# Patient Record
Sex: FEMALE | Race: WHITE | Employment: OTHER | URBAN - NONMETROPOLITAN AREA
[De-identification: names, ages, dates, MRNs, and addresses within clinical notes are randomized per-mention and may not be internally consistent; named-entity substitution may affect disease eponyms.]

---

## 2017-01-12 PROBLEM — G57.43: Status: ACTIVE | Noted: 2017-01-12

## 2017-01-12 PROBLEM — G57.33 PERONEAL NERVE LESION OF BOTH LOWER EXTREMITIES: Status: ACTIVE | Noted: 2017-01-12

## 2017-01-23 PROBLEM — R42 DIZZINESS: Status: ACTIVE | Noted: 2017-01-23

## 2019-02-04 PROBLEM — Z91.81 AT HIGH RISK FOR FALLS: Status: ACTIVE | Noted: 2019-02-04

## 2019-02-04 PROBLEM — Z91.199 NON-COMPLIANCE: Status: ACTIVE | Noted: 2019-02-04

## 2019-06-10 PROBLEM — L72.0 EPIDERMOID CYST: Status: ACTIVE | Noted: 2019-06-10

## 2020-09-11 PROBLEM — Q05.4 SPINA BIFIDA WITH HYDROCEPHALUS (HCC): Status: ACTIVE | Noted: 2020-09-11

## 2020-09-11 PROBLEM — G40.909 SEIZURE CEREBRAL (HCC): Status: ACTIVE | Noted: 2020-09-11

## 2020-09-11 PROBLEM — M54.40 CHRONIC LOW BACK PAIN WITH SCIATICA: Status: ACTIVE | Noted: 2020-09-11

## 2022-01-11 PROBLEM — Z86.69 HISTORY OF TETHERED SPINAL CORD: Status: ACTIVE | Noted: 2022-01-11

## 2022-01-11 PROBLEM — G91.1 OBSTRUCTIVE HYDROCEPHALUS (HCC): Status: ACTIVE | Noted: 2022-01-11

## 2022-02-15 PROBLEM — T85.09XA OBSTRUCTED VP SHUNT, INITIAL ENCOUNTER (HCC): Status: ACTIVE | Noted: 2022-02-15

## 2022-02-15 PROBLEM — G43.009 MIGRAINE WITHOUT AURA AND WITHOUT STATUS MIGRAINOSUS, NOT INTRACTABLE: Status: ACTIVE | Noted: 2022-02-15

## 2022-12-15 PROBLEM — G63 POLYNEUROPATHY ASSOCIATED WITH UNDERLYING DISEASE (HCC): Status: ACTIVE | Noted: 2022-12-15

## 2023-01-26 DIAGNOSIS — G43.009 MIGRAINE WITHOUT AURA AND WITHOUT STATUS MIGRAINOSUS, NOT INTRACTABLE: ICD-10-CM

## 2023-01-26 DIAGNOSIS — G44.86 CERVICOGENIC HEADACHE: ICD-10-CM

## 2023-01-26 RX ORDER — BUTALBITAL, ACETAMINOPHEN AND CAFFEINE 50; 325; 40 MG/1; MG/1; MG/1
TABLET ORAL
Qty: 60 TABLET | Refills: 1 | Status: SHIPPED | OUTPATIENT
Start: 2023-01-26 | End: 2023-02-23 | Stop reason: SDUPTHER

## 2023-03-16 ENCOUNTER — OFFICE VISIT (OUTPATIENT)
Dept: NEUROLOGY | Age: 38
End: 2023-03-16
Payer: MEDICARE

## 2023-03-16 VITALS
DIASTOLIC BLOOD PRESSURE: 64 MMHG | HEIGHT: 59 IN | BODY MASS INDEX: 26.61 KG/M2 | OXYGEN SATURATION: 98 % | SYSTOLIC BLOOD PRESSURE: 96 MMHG | HEART RATE: 90 BPM | WEIGHT: 132 LBS

## 2023-03-16 DIAGNOSIS — G44.86 CERVICOGENIC HEADACHE: ICD-10-CM

## 2023-03-16 DIAGNOSIS — R41.3 MEMORY PROBLEM: ICD-10-CM

## 2023-03-16 DIAGNOSIS — R42 DIZZINESS: ICD-10-CM

## 2023-03-16 DIAGNOSIS — G93.5 CHIARI MALFORMATION TYPE I (HCC): ICD-10-CM

## 2023-03-16 DIAGNOSIS — G89.29 CHRONIC LOW BACK PAIN WITH SCIATICA, SCIATICA LATERALITY UNSPECIFIED, UNSPECIFIED BACK PAIN LATERALITY: ICD-10-CM

## 2023-03-16 DIAGNOSIS — R26.9 GAIT DIFFICULTY: ICD-10-CM

## 2023-03-16 DIAGNOSIS — Q07.00 ARNOLD-CHIARI DEFORMITY (HCC): ICD-10-CM

## 2023-03-16 DIAGNOSIS — Q05.4 SPINA BIFIDA WITH HYDROCEPHALUS, UNSPECIFIED SPINAL REGION (HCC): ICD-10-CM

## 2023-03-16 DIAGNOSIS — Z91.81 AT HIGH RISK FOR FALLS: ICD-10-CM

## 2023-03-16 DIAGNOSIS — G63 POLYNEUROPATHY ASSOCIATED WITH UNDERLYING DISEASE (HCC): ICD-10-CM

## 2023-03-16 DIAGNOSIS — M54.40 CHRONIC LOW BACK PAIN WITH SCIATICA, SCIATICA LATERALITY UNSPECIFIED, UNSPECIFIED BACK PAIN LATERALITY: ICD-10-CM

## 2023-03-16 DIAGNOSIS — M54.2 NECK PAIN: ICD-10-CM

## 2023-03-16 DIAGNOSIS — G43.009 MIGRAINE WITHOUT AURA AND WITHOUT STATUS MIGRAINOSUS, NOT INTRACTABLE: Primary | ICD-10-CM

## 2023-03-16 DIAGNOSIS — G40.909 SEIZURE CEREBRAL (HCC): ICD-10-CM

## 2023-03-16 DIAGNOSIS — G60.9 IDIOPATHIC PERIPHERAL NEUROPATHY: ICD-10-CM

## 2023-03-16 DIAGNOSIS — N31.9 NEUROGENIC BLADDER: ICD-10-CM

## 2023-03-16 PROCEDURE — G8419 CALC BMI OUT NRM PARAM NOF/U: HCPCS | Performed by: PSYCHIATRY & NEUROLOGY

## 2023-03-16 PROCEDURE — 99214 OFFICE O/P EST MOD 30 MIN: CPT | Performed by: PSYCHIATRY & NEUROLOGY

## 2023-03-16 PROCEDURE — G8427 DOCREV CUR MEDS BY ELIG CLIN: HCPCS | Performed by: PSYCHIATRY & NEUROLOGY

## 2023-03-16 PROCEDURE — G8482 FLU IMMUNIZE ORDER/ADMIN: HCPCS | Performed by: PSYCHIATRY & NEUROLOGY

## 2023-03-16 PROCEDURE — 4004F PT TOBACCO SCREEN RCVD TLK: CPT | Performed by: PSYCHIATRY & NEUROLOGY

## 2023-03-16 RX ORDER — GABAPENTIN 300 MG/1
300 CAPSULE ORAL DAILY
COMMUNITY
Start: 2023-01-09

## 2023-03-16 RX ORDER — FLUOXETINE HYDROCHLORIDE 20 MG/1
CAPSULE ORAL
COMMUNITY
Start: 2023-02-06

## 2023-03-16 RX ORDER — BUTALBITAL, ACETAMINOPHEN AND CAFFEINE 50; 325; 40 MG/1; MG/1; MG/1
TABLET ORAL
Qty: 60 TABLET | Refills: 2 | Status: SHIPPED | OUTPATIENT
Start: 2023-03-16

## 2023-03-16 RX ORDER — PROMETHAZINE HYDROCHLORIDE 25 MG/1
TABLET ORAL
Qty: 60 TABLET | Refills: 2 | Status: SHIPPED | OUTPATIENT
Start: 2023-03-16

## 2023-03-16 RX ORDER — BUSPIRONE HYDROCHLORIDE 5 MG/1
5 TABLET ORAL 3 TIMES DAILY
COMMUNITY
Start: 2022-09-08

## 2023-03-16 RX ORDER — QUETIAPINE 150 MG/1
TABLET, FILM COATED, EXTENDED RELEASE ORAL
COMMUNITY
Start: 2023-01-19 | End: 2023-03-16

## 2023-03-16 RX ORDER — MULTIVITAMIN
TABLET ORAL
COMMUNITY
Start: 2022-12-09

## 2023-03-16 RX ORDER — MECLIZINE HYDROCHLORIDE 25 MG/1
TABLET ORAL
Qty: 90 TABLET | Refills: 2 | Status: SHIPPED | OUTPATIENT
Start: 2023-03-16

## 2023-03-16 RX ORDER — LISINOPRIL 10 MG/1
TABLET ORAL
COMMUNITY
Start: 2023-01-06

## 2023-03-16 RX ORDER — OXCARBAZEPINE 150 MG/1
TABLET, FILM COATED ORAL DAILY
COMMUNITY
Start: 2023-03-03

## 2023-03-16 RX ORDER — OXYBUTYNIN CHLORIDE 15 MG/1
TABLET, EXTENDED RELEASE ORAL
COMMUNITY
Start: 2023-01-23

## 2023-03-16 ASSESSMENT — ENCOUNTER SYMPTOMS
COUGH: 0
BOWEL INCONTINENCE: 1
SWOLLEN GLANDS: 0
ABDOMINAL DISTENTION: 0
RHINORRHEA: 0
DIARRHEA: 0
CONSTIPATION: 0
FACIAL SWEATING: 0
COLOR CHANGE: 0
BLOOD IN STOOL: 0
EYE PAIN: 0
WHEEZING: 0
BLURRED VISION: 0
SORE THROAT: 0
EYE ITCHING: 0
VOICE CHANGE: 0
ABDOMINAL PAIN: 0
CHOKING: 0
CHEST TIGHTNESS: 0
CHANGE IN BOWEL HABIT: 0
SCALP TENDERNESS: 0
NAUSEA: 1
FACIAL SWELLING: 0
APNEA: 0
VOMITING: 0
SINUS PRESSURE: 0
EYE DISCHARGE: 0
SHORTNESS OF BREATH: 0
EYE WATERING: 0
EYE REDNESS: 0

## 2023-03-16 NOTE — PROGRESS NOTES
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          9, 10 CN: Normal spontaneous, reflex palate movements         11 CN:   Normal  Shoulder shrug and  Strength         12 CN :   Normal  Tongue movements and  Tongue  In midline                        No tongue   Fasciculations or atrophy         C) MOTOR  EXAM:                   Strength  In upper  Extremities   within normal limits. BOTH  LOWER  EXTREMITIES   ARE  DEFORMED  IN  BRACES                         WITH  SPASTICITY                                      No  Asterixis.  Sustention  Tremor , Resting  Tremor   absent                    No other  Abnormal  Movements noted             D) SENSORY :               light touch, pinprick, position   DECREASED   IN  BOTH  LOWER  LEGS        E) REFLEXES:                   Deep  Tendon  Reflexes   BRISK                                                   F) COORDINATION  AND  GAIT :                                Station and  Gait  SLOW , WIDE BASED, UNSTEADY                                      Romberg's test   POSITIVE                          Ataxia   POSITIVE           Assessment:           Patient Active Problem List   Diagnosis    Migraine headache    Chronic back pain    Anxiety    Chronic diarrhea    Neck pain    Muscle spasm    Depressed    Cervicogenic headache    Asthma    Lumbar disc disease    Chiari malformation type I ([facility])    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[facility])    Gait difficulty    Cervical radiculopathy     (ventriculoperitoneal) shunt status    Neurogenic bladder    Chiari malformation type II ([facility])    Chronic pain syndrome    Spina bifida ([facility])    Chronic lumbar radiculopathy    Balance problem    Smoker    Peroneal nerve lesion of both lower extremities    Tibial neuropathy of both lower extremities    Peripheral neuropathy    Dizziness    At high risk for falls    H/O whiplash injury to neck    Non-compliance    Epidermal cyst    Memory problem    Spina bifida with hydrocephalus ([facility])    Seizure cerebral ([facility])    Chronic low back pain with sciatica    Gastroesophageal reflux disease without esophagitis    Obstructive hydrocephalus ([facility])    History of tethered spinal cord    Obstructed ventriculoperitoneal shunt ([facility])    Migraine without aura and without status migrainosus, not intractable    Post-op pain    Uterine prolapse    Status post vaginal hysterectomy    Diminished sensation due to laxity of vagina    TVH, BS, Cysto, Mccal Culdoplasty, Perineoplasty 4/28/22    Polyneuropathy associated with underlying disease ([facility])  CT OF THE HEAD WITHOUT CONTRAST  2/15/2022 11:07 am       TECHNIQUE:   CT of the head was performed without the administration of intravenous   contrast. Dose modulation, iterative reconstruction, and/or weight based   adjustment of the mA/kV was utilized to reduce the radiation dose to as low   as reasonably achievable. COMPARISON:   CT brain performed 03/05/2021. MRI brain performed 12/04/2021. HISTORY:   ORDERING SYSTEM PROVIDED HISTORY: chiari Type 2   TECHNOLOGIST PROVIDED HISTORY:       chiari Type 2   Is the patient pregnant?->No       FINDINGS:   BRAIN/VENTRICLES: There is no acute intracranial hemorrhage, mass effect, or   midline shift. There is satisfactory overall gray-white matter   differentiation. There is redemonstration of cerebellar tonsillar ectopia   that is similar compared to prior. The ventricular structures are   symmetrically prominent and stable in size compared to prior. There is   redemonstration of a shunt tube from a left parietal approach. The   infratentorial structures are unremarkable. ORBITS: The visualized portion of the orbits demonstrate no acute abnormality. SINUSES: The visualized paranasal sinuses and mastoid air cells demonstrate   no acute abnormality. SOFT TISSUES/SKULL:  No acute abnormality of the visualized skull or soft   tissues. Impression   No acute intracranial abnormality. Cerebellar tonsillar ectopia and symmetric ventricular enlargement that is   stable compared to prior. CT OF THE HEAD WITHOUT CONTRAST  3/5/2021 1:20 pm       TECHNIQUE:   CT of the head was performed without the administration of intravenous   contrast. Dose modulation, iterative reconstruction, and/or weight based   adjustment of the mA/kV was utilized to reduce the radiation dose to as low   as reasonably achievable. COMPARISON:   MRI of the brain performed 02/25/2019.        HISTORY:   ORDERING SYSTEM PROVIDED HISTORY: Dizziness   TECHNOLOGIST PROVIDED HISTORY:   Is the patient pregnant?->No   Reason for Exam: Shunt causing pain to left side of neck x 4 days   Acuity: Acute   Type of Exam: Initial       FINDINGS:   BRAIN/VENTRICLES: There is no acute intracranial hemorrhage, mass effect, or   midline shift. There is satisfactory gray-white matter differentiation. There is a prominent ventricular system with a shunt tube from a left   parietal approach. The shunt tube is discontinuous better visualized on the   CT soft tissue neck. There is significant cerebellar tonsillar ectopia   redemonstrated. ORBITS: The visualized portion of the orbits demonstrate no acute abnormality. SINUSES: The visualized paranasal sinuses and mastoid air cells demonstrate   no acute abnormality. SOFT TISSUES/SKULL:  No acute abnormality of the visualized skull or soft   tissues. Impression   No acute intracranial abnormality. Prominent ventricular system with a shunt tube from a left parietal approach   that is discontinuous and better visualized on the CT soft tissue neck. Significant cerebellar tonsillar ectopia that is similar compared to prior   exam.               CT OF THE NECK WITHOUT CONTRAST  3/5/2021       TECHNIQUE:   CT of the neck was performed without the administration of intravenous   contrast. Multiplanar reformatted images are provided for review. Dose   modulation, iterative reconstruction, and/or weight based adjustment of the   mA/kV was utilized to reduce the radiation dose to as low as reasonably   achievable. COMPARISON:   Cervical spine radiographs performed 04/23/2008.        HISTORY:   ORDERING SYSTEM PROVIDED HISTORY: Dizziness   TECHNOLOGIST PROVIDED HISTORY:   Is the patient pregnant?->No   Reason for Exam: Shunt causing pain to left side of neck x 4 days   Acuity: Acute   Type of Exam: Initial       FINDINGS:   PHARYNX/LARYNX:  The palatine tonsils are normal in appearance. The tongue   is normal in appearance. The valleculae, epiglottis, aryepiglottic folds and   pyriform sinuses appear unremarkable. The true and false vocal cords are   normal in appearance. No mass or abscess is seen. SALIVARY GLANDS/THYROID:  The parotid and submandibular glands appear   unremarkable. There is a subtle right thyroid lobe nodule measuring 1.5 x   1.0 cm. LYMPH NODES:  No cervical or supraclavicular lymphadenopathy is seen. SOFT TISSUES:  There is left-sided shunt tubing that appears detached and   distracted approximately 6.0 cm. BRAIN/ORBITS/SINUSES:  The visualized portion of the intracranial contents   appear unremarkable. The visualized portion of the orbits, paranasal sinuses   and mastoid air cells demonstrate no acute abnormality. LUNG APICES/SUPERIOR MEDIASTINUM:  No focal consolidation is seen within the   visualized lung apices. No superior mediastinal lymphadenopathy or mass. The visualized portion of the trachea appears unremarkable. BONES:  No aggressive appearing lytic or blastic bony lesion. Impression   Redemonstration of a discontinuous left-sided shunt tube that is similar   compared to multiple prior examinations. No acute abnormality of the soft tissues of the neck. MRI OF THE BRAIN WITHOUT CONTRAST  2/25/2019 1:58 pm       TECHNIQUE:   Multiplanar multisequence MRI of the brain was performed without the   administration of intravenous contrast.       COMPARISON:   01/26/2017. HISTORY:   ORDERING SYSTEM PROVIDED HISTORY: Cervicogenic headache   TECHNOLOGIST PROVIDED HISTORY:   Ordering Physician Provided Reason for Exam:  Headaches and a history of   spina bifida. Acuity: Unknown   Type of Exam: Subsequent evaluation.    Additional signs and symptoms: Cervicogenic headache; Dizziness; Migraine   without aura and without status migrainosus, not intractable; Arnold chiari   malformation, type II; Gait difficulty. FINDINGS:   INTRACRANIAL STRUCTURES/VENTRICLES: There is no evidence of an acute infarct. A left parietal approach ventriculostomy catheter is unchanged in position. Unchanged low-lying cerebellar tonsils with a peg shaped configuration with   decreased size of the posterior fossa including effacement of the 4th   ventricle. Prominence of the lateral and 3rd ventricles, which is overall   unchanged in size from the prior exam.  No evidence of mass effect or midline   shift. T2 hyperintensity is seen within the bilateral periventricular   subcortical white matter predominantly posteriorly. The normal signal voids   within the major intracranial vessels appear maintained. No acute   abnormality in the sellar or suprasellar region. ORBITS: The visualized portion of the orbits demonstrate no acute abnormality. SINUSES: Mucosal thickening of the paranasal sinuses with an air-fluid level   within the left maxillary sinus. No acute abnormality of the mastoid air   cells. BONES/SOFT TISSUES: The bone marrow signal intensity appears normal. The soft   tissues demonstrate no acute abnormality. Impression   1. Overall, stable appearance of the brain. 2. Findings compatible with a Chiari malformation with a left parietal   approach ventriculostomy catheter. 3. There is unchanged size and configuration of the ventricular system. 4. No acute infarct. 5. Scattered mucosal thickening of the paranasal sinuses with an air-fluid   level in the left maxillary sinus.          MRI OF THE CERVICAL SPINE WITHOUT CONTRAST 2/25/2019 2:21 pm       TECHNIQUE:   Multiplanar multisequence MRI of the cervical spine was performed without the   administration of intravenous contrast.       COMPARISON:   6/20/2016       HISTORY:   ORDERING SYSTEM PROVIDED HISTORY: Cervicogenic headache   TECHNOLOGIST PROVIDED HISTORY:   Ordering Physician Provided Reason for Exam:  Neck pain and headaches. History of spina bifida. Acuity: Unknown   Type of Exam: Unknown   Additional signs and symptoms: Cervicogenic headache; Neck pain; Cervical   radiculopathy       Initial evaluation. FINDINGS:   BONES/ALIGNMENT: There is normal alignment of the spine. The vertebral body   heights are maintained. The bone marrow signal appears unremarkable. There   redemonstration of herniation of the cerebellar tonsils into the foramen   magnum to the level of C3, consistent with Chiari malformation. There is mild disc space narrowing and osteophytes at C3-4, C4-5, C5-6, and   C6-7. SPINAL CORD:  No abnormal signal is identified within the spinal cord. SOFT TISSUES: No paraspinal mass identified. There is an 8 mm nodule in the   right lobe of the thyroid, for which no follow-up is suggested per ACR   criteria. C2-C3:  The thecal sac and neural foramina are intact. C3-C4: There is a small disc protrusion. The thecal sac is not stenotic. The neural foramina are intact. C4-C5: There is a disc protrusion or osteophyte measuring 2-3 mm. The thecal   sac is mildly stenotic measuring 9.2 mm. Disc and/or osteophytes cause   minimal narrowing of the neural foramina bilaterally. C5-C6: There is a disc protrusion or osteophyte measuring 2-3 mm toward the   right. The thecal sac is mildly stenotic measuring 9.3 mm. Disc and/or   osteophytes result in mild narrowing of the neural foramina bilaterally. C6-C7:  The thecal sac and neural foramina are intact. C7-T1:  The thecal sac and neural foramina are intact. Impression   Redemonstration of Chiari malformation. Disc and osteophytes result in minimal narrowing of the neural foramina at   C4-5 and C5-6. No stenosis of the thecal sac in the cervical region. ULTRASOUND EVALUATION OF THE CAROTID ARTERIES       2/25/2019       COMPARISON:   None.        HISTORY:   ORDERING SYSTEM PROVIDED HISTORY: Cervicogenic headache   TECHNOLOGIST PROVIDED HISTORY:   Ordering Physician Provided Reason for Exam: neck pain and dizziness   Acuity: Acute   Type of Exam: Initial   Relevant Medical/Surgical History: Smoker 1PPD       FINDINGS:       RIGHT:       The right common carotid artery demonstrates peak systolic velocities of 84   and 77 cm/sec in the proximal and distal segments respectively. The right internal carotid artery demonstrates the systolic velocities of 81,   71, and 73 cm/sec in the proximal, mid and distal segments respectively. The external carotid artery is patent. The vertebral artery demonstrates   normal antegrade flow. Mild intimal thickening and soft plaque resulting in stenosis of   approximately 24% in the proximal ICA. ICA/CCA ratio of 1.0. LEFT:       The left common carotid artery demonstrates peak systolic velocities of 697,   85 cm/sec in the proximal and distal segments respectively. The left internal carotid artery demonstrates the systolic velocities of 71,   73, and 79 cm/sec in the proximal, mid and distal segments respectively. The external carotid artery is patent. The vertebral artery demonstrates   normal antegrade flow. Mild intimal thickening and soft plaque with measured stenosis of up to 41%   in the proximal internal carotid artery. ICA/CCA ratio of 0.7. Impression   The right internal carotid artery demonstrates 0-50% stenosis . The left internal carotid artery demonstrates 0-50% stenosis . Bilateral vertebral arteries are patent with flow in the normal direction.                  MRI OF THE BRAIN WITHOUT CONTRAST  1/26/2017 5:15 pm       TECHNIQUE:   Multiplanar multisequence MRI of the brain was performed without the   administration of intravenous contrast.       COMPARISON:   07/20/2016       HISTORY:   ORDERING SYSTEM PROVIDED HISTORY: Migraine without aura and with status migrainosus, not intractable   TECHNOLOGIST PROVIDED HISTORY:   Ordering Physician Provided Reason for Exam: Dizzy and light headed. Left   side of face and left arm go numb. Symptoms for 2 weeks. Patient has a    shunt. Acuity: Unknown   Type of Exam: Initial   Additional signs and symptoms: Migraine without aura and with status   migrainosus, not intractable; Cervicogenic headache; Chiari malformation type   I; Armani Patter chiari deformity; Gait difficulty; Arnold-Chiari malformation, type   II; Balance problem; Dizziness. FINDINGS:   INTRACRANIAL STRUCTURES/VENTRICLES: There is no acute infarct. Chiari 2   malformation changes once again noted including with cerebellar tonsillar   extension into the upper cervical spine and crowding of the foramen magnum   and posterior fossa. Findings are similar to previous. Left parietal   ventriculostomy catheter again noted. Thinned appearance of the septum   pellucidum again identified. Lateral ventricular enlargement without   significant transependymal edema again identified and unchanged. No midline shift. No new abnormal extra-axial fluid collections. ORBITS: The visualized portion of the orbits demonstrate no acute abnormality. SINUSES: Mild bilateral paranasal sinus mucosal thickening. BONES/SOFT TISSUES: The bone marrow signal intensity appears normal. The   craniocervical junction is normal in appearance. Impression   1. No significant interval change. Findings of Chiari 2 malformation once   again noted. 2. Lateral ventricular enlargement with ventriculostomy once again noted   appearing similar to previous and without evidence of significant interval   transependymal edema signal.             MRI OF THE LUMBAR SPINE WITHOUT CONTRAST, 11/28/2016 5:29 pm       TECHNIQUE:   Multiplanar multisequence MRI of the lumbar spine was performed without the   administration of intravenous contrast.       COMPARISON:   None. HISTORY:   ORDERING SYSTEM PROVIDED HISTORY: Arnold-Chiari malformation, type II [facility]   TECHNOLOGIST PROVIDED HISTORY:   Ordering Physician Provided Reason for Exam:  Low back pain, bilateral leg   pain. Symptoms for a while, worse recently. Acuity: Chronic   Type of Exam: Initial   Additional signs and symptoms:  Chronic lumbar radiculopathy; Lumbar disc   disease; Chronic low back pain with sciatica, sciatica laterality   unspecified, unspecified back pain laterality; Muscle spasm. FINDINGS:   Visualized spinal cord demonstrates mild loss of caliber. Intramedullary T2   hyperintense signal noted at approximately L1 level may relate with a   terminal ventricle or small syrinx. Low-lying conus is noted with tethering   of the cord along with this rate thick appearance of the spinal cord at the   lumbosacral junction with neural placode reaching the subcutaneous fat at   approximately L5-S1 level. Vertebral body heights are essentially maintained. Disc desiccation at L4-5   level       . L1-L2: Bilateral facet arthrosis with ligamentum flavum thickening with mild   bilateral neural foraminal narrowing without significant central canal   stenosis. L2-L3: Bilateral facet arthrosis with ligamentum flavum thickening and   broad-based disc bulge with mild bilateral neural foraminal narrowing without   significant central canal stenosis. L3-L4: Bilateral facet arthrosis with ligamentum flavum thickening and   broad-based disc bulge with moderate to severe bilateral neural foraminal   narrowing. Mild approximation of the exiting right L3 nerve root. L4-L5: Broad-based disc bulge with a central disc extrusion with annulus   fissure resulting in severe bilateral neural foraminal narrowing without   significant central canal stenosis. L5-S1: There is no significant disc herniation, spinal canal stenosis or   neural foraminal narrowing.            Impression   Redemonstration of low-lying conus with tethered cord with associated spinal   dysraphism with postsurgical changes. Lumbar spondylotic changes predominant at L4-5 and L3-L4 levels. Please see   above level by level complete analysis. MRI BRAIN WO CONTRAST       HISTORY:     Neck pain, Muscle spasm, Other depression, Cervicogenic headache, Mild intermittent asthma without complication, Lumbar disc disease, Chiari malformation type I (HCC), 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HCC), Migraine without aura and with status    migrainosus, not intractable, Spina bifida ([address] Anxiety, Chr           TECHNIQUE: Multiplanar multisequence MR images of the brain obtained without contrast.         COMPARISON: 1/22/10. FINDINGS:    Chiari 2 malformation changes again identified with cerebellar tonsillar ectopia and tectorial beaking. Foramen magnum crowding again noted. Left parietal ventriculostomy catheter again identified, unchanged in appearance. Lateral ventricular enlargement once again identified. Colpocephalic pattern, similar to previous noted. The lateral ventricles appear slightly larger compared to    previous. No significant interval transependymal edema is noted. Thinned/absent septum pellucidum. Small appearance of the corpus callosum splenium. Diffusion-weighted images and ADC maps do not demonstrate areas of restricted diffusion. No acute intracranial hemorrhage, mass effect, midline shift or obstructive hydrocephalus. No abnormal extra-axial fluid collections. Age normal brain volume. Mild bilateral paranasal sinus mucosal thickening. Unremarkable sellar appearance. Craniocervical junction appears unremarkable without evidence of significant cerebellar tonsillar ectopia. Preserved skull base major arterial flow voids on T2-weighted images. Impression   1. Chiari 2 changes again noted.    2.  Enlarged lateral ventricles, similar to previous and appearing only slightly increased in size. No significant interval transependymal edema signal is noted. Final report electronically signed by Corky Rose M.D. on 7/20/2016          MRI CERVICAL SPINE WO CONTRAST       HISTORY:     Neck pain, Muscle spasm, Other depression, Cervicogenic headache, Mild intermittent asthma without complication, Lumbar disc disease, Chiari malformation type I ([address] 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HCC), Migraine without aura and with status    migrainosus, not intractable, Spina bifida ([address] Anxiety, Chr           TECHNIQUE: Multiplanar multisequence MR images of the cervical spine were obtained. COMPARISON: 1/22/10       FINDINGS:    Chiari 2 malformation changes again identified with cerebellar tonsillar ectopia and a crowded foramen magnum as well as posterior fossa. Straightening of cervical lordosis. Minimal dextroconvex cervicothoracic scoliosis. No cervical spinal cord syrinx is identified. 7.5 MM right thyroid cyst or nodule, high T2 signal.    C3-4: Slight disc bulge without significant central spinal stenosis. C4-5: Slight disc bulge/small central disc protrusion without significant cervical spinal stenosis. C5-6: Minimal right-sided uncovertebral joint hypertrophy without significant spinal stenosis. Impression   1. Slight disc bulges in the mid and upper cervical spine without contributing to significant cervical spinal stenosis. 2.  No cervical spinal cord syrinx. 3.  Chiari 2 malformation changes. Final report electronically signed by Corky Rose M.D. on 7/20/2016             May  8 2013  9:52PM [id number]  CT LUMBAR AND OR SACRUM WO    CONT   Reason for Exam:  ^Pain/trauma       FULL RESULT:   CT lumbar spine without contrast       Clinical indication: Fall 4 days ago. Comparison: None. Technique: Transaxial tomograms were obtained through the lumbar   spine without contrast, including the upper sacrum.   Multiplanar   reconstructions were performed. Findings: There is general maintenance of the lumbar vertebral   body heights. A 2 mm nonobstructive stone seen in the upper pole   left kidney. No prevertebral soft tissue abnormality is   identified. No definite fracture or dislocation is seen. The T11-T12 disc   space is unremarkable. The T12-L1 disc space is unremarkable. The L1-L2 disc space is unremarkable. At L2-L3, the disc space is unremarkable. There is mild   bilateral facet arthrosis. At L3-L4, no significant central spinal stenosis is seen. The   neural foramina are patent. Bilateral facet arthrosis is   present. At L4-L5, the disc space is mildly narrowed. There is a   broad-based central and left paracentral disc protrusion. This   contacts the left anterior thecal sac. There is ligamentum   flavum hypertrophy bilaterally along with facet arthrosis. This   is narrowing each of the respective neural foramina. At L5-S1, there is a spina bifida defect. There is facet   arthrosis versus postsurgical changes on the right. A   myelomeningocele is present here. There is some nonspecific   calcification of the posterior margin of the thecal sac on the   left. No other significant findings are noted. IMPRESSION:   Impression:   1. No acute findings. 2.  Spina bifida defect L5-S1 with a myelomeningocele. There   are some nonspecific calcifications at the posterior margin of   thecal sac. There appears to be a tethered cord. 3.  Broad-based central left paracentral disc protrusion at   L4-L5. 4.  Multilevel facet arthrosis as above. 5.  Punctate nonobstructive stone left kidney. 6.  Stable study. This report was electronically signed by: Tianna Martinez MD       8th May, 2013 10:29:00PM EDT   Transcribed by: [facility] on May  8 2013 10:29P         Plan:         VISITING DIAGNOSIS:        ICD-10-CM    1.  Migraine without aura and without status migrainosus, not intractable  G43.009 butalbital-acetaminophen-caffeine (FIORICET, ESGIC) -40 MG per tablet     promethazine (PHENERGAN) 25 MG tablet      2. Polyneuropathy associated with underlying disease ([facility] [address]  G63       3. Spina bifida with hydrocephalus, unspecified spinal region [facility]  Q05.4       4. Obstructive hydrocephalus (HCC)  G91.1       5. Seizure cerebral ([facility]  G40.909       6. Chronic low back pain with sciatica, sciatica laterality unspecified, unspecified back pain laterality  M54.40     G89.29       7. Neck pain  M54.2       8. Neurogenic bladder  N31.9       9. Gait difficulty  R26.9       10. Memory problem  R41.3       11. Dizziness  R42 meclizine (ANTIVERT) 25 MG tablet      12. Cervicogenic headache  G44.86 butalbital-acetaminophen-caffeine (FIORICET, ESGIC) -40 MG per tablet     promethazine (PHENERGAN) 25 MG tablet      13. Chiari malformation type I ([facility]  G93.5       14. 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HCC)  Q07.00       15. At high risk for falls  Z91.81       16. Idiopathic peripheral neuropathy  G60.9                  CONCERNS   &   INCREASED   RISK   FOR        *   CHRONIC  MIGRAINES  AND  HEADACHES        *   COGNITIVE  AND  MEMORY  PROBLEMS         *  MONONEUROPATHIES, RADICULOPATHY  AND  PLEXOPATHY        *  GAIT  DIFFICULTIES  &   BALANCE PROBLMES   AND  FALL                  VARIOUS  RISK   FACTORS   WERE  REVIEWED   AND   DISCUSSED. *  PATIENT   HAS  MULTIPLE   MEDICAL,  NEUROLOGICAL      AND   MENTAL  HEALTH  PROBLEMS . PATIENT'S   MANAGEMENT  IS  CHALLENGING. PLAN:       Ana M Chandler  Of  The  Diagnoses,  The  Management & Treatment  Options           AND    Care  plan  Were        Reviewed and   Discussed   With  patient. * Goals  And  Expectations  Of  The  Therapy  Discussed   And  Reviewed.        *   Benefits   And   Side  Effect  Profile  Of  Medication/s   Were   Discussed             * Need   For  Further   Follow up For  The  Various  Problems  Were discussed. * Results  Of  The  Previous  Diagnostic tests were reviewed and questions answered. patient  understand the same. Medical  Decision  Making  Was  Made  Based on the   Complexity  Of  Above  Mentioned  Diagnoses,        Data reviewed   & diagnostic  Tests  Reviewed,  Risk  Of  Significant   Co morbidities and complicating   Factors. Medical  Decision  Was   High  Complexity  Due   To  The  Patient's  Multiple  Symptoms,  Advancing   Disease,      Complex  Treatment  Regimen,  Multiple medications and   Risk  Of   Side  Effects,  Difficulty  In  Medication      Management  And  Diagnostic  Challenges   In  View  Of  The  Associated   Co  Morbid  Conditions   And  Problems. * FALL   PRECAUTIONS. THESE  REVIEWED   AND  DISCUSSED      *  USE   WALKING  ASSISTANCE  DEVICES     QUAD  CANE  /   WALKER          *   BE  CAREFUL  WITH  ACTIVITIES   INCLUDING  DRIVING. *   AVOID   NECK  AND/ BACK  STRAINING  ACTIVITIES        *   ADEQUATE   FLUID  INTAKE   AND  ELECTROLYTE  BALANCE         * KEEP  DAIRY  OF   THE  NEUROLOGICAL  SYMPTOMS        RECORDING THE    DURATION  AND  FREQUENCY. *  AVOID    CONDITIONS  AND  FACTORS   THAT  MAKE   NEUROLOGICAL  SYMPTOMS  WORSE. *  TO  MAINTAIN  REGULAR  SLEEP  WAKE  CYCLES. *   TO  HAVE  ADEQUATE  REST  AND   SLEEP    HOURS.          *    AVOID  ANY USAGE OF                   TOBACCO,  EXCESSIVE  ALCOHOL  AND   ILLEGAL   SUBSTANCES              *  CONTINUE MEDICATIONS PRESCRIBED    AS    RECOMMENDED       *   Compliance   With  Medications   And  Instructions        * CURRENTLY  TOLERATING  THE  PRESCRIBED   MEDICATIONS. WITHOUT  ANY  SIGNIFICANT  SIDE  EFFECTS   &  GETTING BENEFIT.           *  May   Use  Pill  Box,    If  Needed        *        Antiplatelet  therapy    As   Recommended  Was   Discussed        *    Prophylactic  Use   Of     Vitamin   B   Complex,  Folic Acid,    Vitamin  B12    Multivitamin   Tablet  Daily        Supplementations   Over  The  Counter  Discussed             *  FOOT  CARE, DAILY  INSPECTION  OF  FEET   AND         PERIODIC  PODIATRY EVALUATIONS . *  EVALUATIONS  AND  FOLLOW UP:                  * PHYSICAL  THERAPY                                                                                        *  NEUROSURGERY  /   [facility]              *  PAIN  MANAGEMENT                  *    UROLOGY                -  DISCUSSED  WITH PATIENT  DURING  MULTIPLE   VISITS     IN    THE  PAST      AND                         PATIENT  NOT  INTERESTED                       Controlled Substances Monitoring: Periodic Controlled Substance Monitoring: Possible medication side effects, risk of tolerance/dependence & alternative treatments discussed. , Assessed functional status. Jose Deshpande MD)          Orders Placed This Encounter   Medications    butalbital-acetaminophen-caffeine (FIORICET, ESGIC) -40 MG per tablet     Si-2    TABLETS   TWICE  DAILY   AS  NEEDED     Dispense:  60 tablet     Refill:  2    meclizine (ANTIVERT) 25 MG tablet     Sig: take 1 tablet by mouth three times a day if needed     Dispense:  90 tablet     Refill:  2    promethazine (PHENERGAN) 25 MG tablet     Sig: ONE   TABLET    2 - 3    TIMES   DAILY      AS   NEEDED     Dispense:  60 tablet     Refill:  2                                     *PATIENT   TO  FOLLOW  UP  WITH   PRIMARY  CARE   AND   OTHER  CONSULTANTS  AS  BEFORE.           *TO  FOLLOW  WITH   MENTAL  HEALTH  PROFESSIONALS ,  INCLUDING            PSYCHOLOGICAL  COUNSELING   AND  PSYCHIATRIC  EVALUTIONS                *  Maintain   Healthy  Life Style    With   Periodic  Monitoring  Of      Any  Medical  Conditions  Including   Elevated  Blood  Pressure,  Lipid  Profile,     Blood  Sugar levels  And   Heart  Disease.                 *   Period   Screening  For  Cancers  Involving  Breast, Colon,     lungs  And  Other  Organs  As  Applicable,  In consultation   With  Your  Primary Care Providers. * Second  Neurological  Opinion  And  Evaluations  In  [facility]  Setting  If  Patient  Is  Interested. *  If  The  Patient remains  Neurologically  Stable   Return   To  [facility] Neurology Department       IN   3 - 4     MONTHS  TIME   FOR  FURTHER  FOLLOW UP.                 *  If   There is  Any  Significant  Worsening   Of  Current  Symptoms  And  Or  If patient  Develops       Any additional  New  Neurological  Symptoms  Or  Significant  Concerns   Should  Call  911 or      Go  To  Emergency  Department  For  Further  Immediate  Evaluation. *   The  Neurological   Findings,  Possible  Diagnosis,  Differential diagnoses   And  Options      For    Further   Investigations   And  management   Are  Discussed  Comprehensively. Medications   And  Prescription   Risks  And  Side effects  Are   Also  Discussed. The  Above  Were  Reviewed  With  patient and     questions  Answered  In  Detail. More   Than   50% of face  To face Time   Was  Spent  On  Counseling   And   Coordination  Of  Care       Of   Patient's multiple   Neurological  Problems   And   Comorbid  Medical   Conditions. Electronically signed by Kiley Ryan MD.,  [facility]       Board Certified in  Neurology &  In  [facility] [address] of Psychiatry and Neurology (ABPN)      DISCLAIMER:   Although every effort was made to ensure the accuracy of this  electronic transcription, some errors in transcription may have occurred. GENERAL PATIENT INSTRUCTIONS:     A Healthy Lifestyle: Care Instructions  Your Care Instructions  A healthy lifestyle can help you feel good, stay at a healthy weight, and have plenty of energy for both work and play. A healthy lifestyle is something you can share with your whole family.   A healthy lifestyle also can lower your risk for serious health problems, such as high blood pressure, heart disease, and diabetes. You can follow a few steps listed below to improve your health and the health of your family. Follow-up care is a key part of your treatment and safety. Be sure to make and go to all appointments, and call your doctor if you are having problems. Its also a good idea to know your test results and keep a list of the medicines you take. How can you care for yourself at home? Do not eat too much sugar, fat, or fast foods. You can still have dessert and treats now and then. The goal is moderation. Start small to improve your eating habits. Pay attention to portion sizes, drink less juice and soda pop, and eat more fruits and vegetables. Eat a healthy amount of food. A 3-ounce serving of meat, for example, is about the size of a deck of cards. Fill the rest of your plate with vegetables and whole grains. Limit the amount of soda and sports drinks you have every day. Drink more water when you are thirsty. Eat at least 5 servings of fruits and vegetables every day. It may seem like a lot, but it is not hard to reach this goal. A serving or helping is 1 piece of fruit, 1 cup of vegetables, or 2 cups of leafy, raw vegetables. Have an apple or some carrot sticks as an afternoon snack instead of a candy bar. Try to have fruits and/or vegetables at every meal.  Make exercise part of your daily routine. You may want to start with simple activities, such as walking, bicycling, or slow swimming. Try to be active 30 to 60 minutes every day. You do not need to do all 30 to 60 minutes all at once. For example, you can exercise 3 times a day for 10 or 20 minutes. Moderate exercise is safe for most people, but it is always a good idea to talk to your doctor before starting an exercise program.  Keep moving. Alexys George the lawn, work in the garden, or [employer].  Take the stairs instead of the elevator at work. If you smoke, quit. People who smoke have an increased risk for heart attack, stroke, cancer, and other lung illnesses. Quitting is hard, but there are ways to boost your chance of quitting tobacco for good. Use nicotine gum, patches, or lozenges. Ask your doctor about stop-smoking programs and medicines. Keep trying. In addition to reducing your risk of diseases in the future, you will notice some benefits soon after you stop using tobacco. If you have shortness of breath or asthma symptoms, they will likely get better within a few weeks after you quit. Limit how much alcohol you drink. Moderate amounts of alcohol (up to 2 drinks a day for men, 1 drink a day for women) are okay. But drinking too much can lead to liver problems, high blood pressure, and other health problems. Family health  If you have a family, there are many things you can do together to improve your health. Eat meals together as a family as often as possible. Eat healthy foods. This includes fruits, vegetables, lean meats and dairy, and whole grains. Include your family in your fitness plan. Most people think of activities such as jogging or tennis as the way to fitness, but there are many ways you and your family can be more active. Anything that makes you breathe hard and gets your heart pumping is exercise. Here are some tips:  Walk to do errands or to take your child to school or the bus. Go for a family bike ride after dinner instead of watching TV. Where can you learn more? Go to [email or web address]://[email or web address]. org and sign in to your [email or web address] account. Enter G077 in the [facility] box to learn more about \"A Healthy Lifestyle: Care Instructions. \"     If you do not have an account, please click on the \"Sign Up Now\" link. Current as of: July 26, 2016  Content Version: 11.2  © 4143-3678 [employer]. Care instructions adapted under license by [facility] ([facility]).  If you have questions about a medical condition or this instruction, always ask your healthcare professional. [place] [address] any warranty or liability for your use of this information.

## 2023-04-27 ENCOUNTER — HOSPITAL ENCOUNTER (EMERGENCY)
Facility: CLINIC | Age: 38
Discharge: HOME | End: 2023-04-27
Attending: EMERGENCY MEDICINE
Payer: MEDICARE

## 2023-04-27 VITALS
HEIGHT: 59 IN | OXYGEN SATURATION: 100 % | DIASTOLIC BLOOD PRESSURE: 96 MMHG | WEIGHT: 137 LBS | SYSTOLIC BLOOD PRESSURE: 126 MMHG | BODY MASS INDEX: 27.62 KG/M2 | RESPIRATION RATE: 18 BRPM | HEART RATE: 90 BPM | TEMPERATURE: 98.1 F

## 2023-04-27 DIAGNOSIS — S31.831A ANAL TEAR: Primary | ICD-10-CM

## 2023-04-27 PROCEDURE — 6370000000 HC RX 637 (ALT 250 FOR IP): Performed by: EMERGENCY MEDICINE

## 2023-04-27 PROCEDURE — 99283 EMERGENCY DEPT VISIT LOW MDM: CPT

## 2023-04-27 RX ORDER — GINSENG 100 MG
CAPSULE ORAL ONCE
Status: COMPLETED | OUTPATIENT
Start: 2023-04-27 | End: 2023-04-27

## 2023-04-27 RX ORDER — BACITRACIN, NEOMYCIN, POLYMYXIN B 400; 3.5; 5 [USP'U]/G; MG/G; [USP'U]/G
OINTMENT TOPICAL
Qty: 14.17 G | Refills: 0 | Status: SHIPPED | OUTPATIENT
Start: 2023-04-27

## 2023-04-27 RX ADMIN — BACITRACIN: 500 OINTMENT TOPICAL at 08:41

## 2023-04-27 ASSESSMENT — PAIN SCALES - GENERAL: PAINLEVEL_OUTOF10: 9

## 2023-04-27 ASSESSMENT — PAIN - FUNCTIONAL ASSESSMENT: PAIN_FUNCTIONAL_ASSESSMENT: 0-10

## 2023-04-27 ASSESSMENT — LIFESTYLE VARIABLES
HOW MANY STANDARD DRINKS CONTAINING ALCOHOL DO YOU HAVE ON A TYPICAL DAY: PATIENT DOES NOT DRINK
HOW OFTEN DO YOU HAVE A DRINK CONTAINING ALCOHOL: NEVER

## 2023-04-27 NOTE — ED PROVIDER NOTES
[address] [facility]      Pt Name: Rosa Campos  MRN: [id number]  Birthdate 1985  Date of evaluation: 4/27/2023      CHIEF COMPLAINT       Chief Complaint   Patient presents with    Fall         HISTORY OF PRESENT ILLNESS      The patient presents with pain in the anal area. She says that she climbed up on a cabinet and fell onto the corner of a cabinet. She says that she hit her anal area on the object. Patient denies other injury. She denies striking her head or losing consciousness. She denies bony pain in her back or tailbone. She has no other complaint. REVIEW OF SYSTEMS       All systems reviewed and negative unless noted in HPI. The patient denies fever or constitutional symptoms. Denies vision change. Denies any sore throat or rhinorrhea. Denies any neck pain or stiffness. Denies chest pain or shortness of breath. No nausea,  vomiting or diarrhea. Reports anal pain and bleeding as noted in HPI. Denies any dysuria. Denies urinary frequency or hematuria. Denies musculoskeletal injury or pain. Wears braces on legs. History of spina bifida. Denies any weakness, numbness or focal neurologic deficit. Denies any skin rash or edema. No recent psychiatric issues. No easy bruising or bleeding. Denies any polyuria, polydypsia or history of immunocompromise.        PAST MEDICAL HISTORY    has a past medical history of Anxiety, 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HCC), Arthritis, Asthma, Cervical radiculopathy, Cervicogenic headache, Chronic back pain, Chronic diarrhea, Depressed, Gait difficulty, GERD (gastroesophageal reflux disease), H/O absence seizures, History of blood transfusion, Lumbar disc disease, Migraine, Neck pain, Neurogenic bladder, Neuropathy, PONV (postoperative nausea and vomiting), Prolonged emergence from general anesthesia, Self-catheterizes urinary bladder, Short-term memory loss, Spina bifida ([facility] Spina

## 2023-04-27 NOTE — DISCHARGE INSTRUCTIONS
can be falsely reassuring. If you notice any worsening, changing or persistent symptoms please call your family doctor or return to the ER immediately. Tell us how we did during your visit at [email or web address]   and let us know about your experience

## 2023-05-04 DIAGNOSIS — G43.009 MIGRAINE WITHOUT AURA AND WITHOUT STATUS MIGRAINOSUS, NOT INTRACTABLE: ICD-10-CM

## 2023-05-04 DIAGNOSIS — G44.86 CERVICOGENIC HEADACHE: ICD-10-CM

## 2023-05-04 RX ORDER — BUTALBITAL, ACETAMINOPHEN AND CAFFEINE 50; 325; 40 MG/1; MG/1; MG/1
TABLET ORAL
Qty: 60 TABLET | Refills: 2 | Status: SHIPPED | OUTPATIENT
Start: 2023-05-04

## 2023-06-08 DIAGNOSIS — K21.9 GASTROESOPHAGEAL REFLUX DISEASE WITHOUT ESOPHAGITIS: ICD-10-CM

## 2023-06-08 RX ORDER — OMEPRAZOLE 40 MG/1
CAPSULE, DELAYED RELEASE ORAL
Qty: 30 CAPSULE | Refills: 1 | Status: SHIPPED | OUTPATIENT
Start: 2023-06-08

## 2023-06-08 NOTE — TELEPHONE ENCOUNTER
Last Appt:  3/16/2023  Next Appt:   6/23/2023  Med verified in Epic     PATIENT REQUESTING A REFILL FOR OMEPRAZOLE    [address] [facility],

## 2023-06-10 DIAGNOSIS — G44.86 CERVICOGENIC HEADACHE: ICD-10-CM

## 2023-06-10 DIAGNOSIS — G43.009 MIGRAINE WITHOUT AURA AND WITHOUT STATUS MIGRAINOSUS, NOT INTRACTABLE: ICD-10-CM

## 2023-06-12 RX ORDER — BUTALBITAL, ACETAMINOPHEN AND CAFFEINE 50; 325; 40 MG/1; MG/1; MG/1
TABLET ORAL
Qty: 60 TABLET | Refills: 0 | Status: SHIPPED | OUTPATIENT
Start: 2023-06-12 | End: 2023-07-03 | Stop reason: SDUPTHER

## 2023-07-03 DIAGNOSIS — G43.009 MIGRAINE WITHOUT AURA AND WITHOUT STATUS MIGRAINOSUS, NOT INTRACTABLE: ICD-10-CM

## 2023-07-03 DIAGNOSIS — G44.86 CERVICOGENIC HEADACHE: ICD-10-CM

## 2023-07-05 RX ORDER — BUTALBITAL, ACETAMINOPHEN AND CAFFEINE 50; 325; 40 MG/1; MG/1; MG/1
TABLET ORAL
Qty: 60 TABLET | Refills: 0 | Status: SHIPPED | OUTPATIENT
Start: 2023-07-05 | End: 2023-07-07 | Stop reason: SDUPTHER

## 2023-07-07 ENCOUNTER — OFFICE VISIT (OUTPATIENT)
Dept: NEUROLOGY | Age: 38
End: 2023-07-07
Payer: MEDICARE

## 2023-07-07 VITALS
BODY MASS INDEX: 26.81 KG/M2 | OXYGEN SATURATION: 98 % | SYSTOLIC BLOOD PRESSURE: 108 MMHG | HEART RATE: 98 BPM | HEIGHT: 59 IN | DIASTOLIC BLOOD PRESSURE: 86 MMHG | RESPIRATION RATE: 12 BRPM | WEIGHT: 133 LBS

## 2023-07-07 DIAGNOSIS — F17.200 SMOKER: ICD-10-CM

## 2023-07-07 DIAGNOSIS — G40.909 SEIZURE CEREBRAL (HCC): ICD-10-CM

## 2023-07-07 DIAGNOSIS — Q05.4 SPINA BIFIDA WITH HYDROCEPHALUS, UNSPECIFIED SPINAL REGION (HCC): ICD-10-CM

## 2023-07-07 DIAGNOSIS — G43.009 MIGRAINE WITHOUT AURA AND WITHOUT STATUS MIGRAINOSUS, NOT INTRACTABLE: ICD-10-CM

## 2023-07-07 DIAGNOSIS — F40.240 CLAUSTROPHOBIA: ICD-10-CM

## 2023-07-07 DIAGNOSIS — G63 POLYNEUROPATHY ASSOCIATED WITH UNDERLYING DISEASE (HCC): ICD-10-CM

## 2023-07-07 DIAGNOSIS — Z98.2 VP (VENTRICULOPERITONEAL) SHUNT STATUS: ICD-10-CM

## 2023-07-07 DIAGNOSIS — G93.5 CHIARI MALFORMATION TYPE I (HCC): ICD-10-CM

## 2023-07-07 DIAGNOSIS — G89.29 CHRONIC LOW BACK PAIN WITH SCIATICA, SCIATICA LATERALITY UNSPECIFIED, UNSPECIFIED BACK PAIN LATERALITY: Primary | ICD-10-CM

## 2023-07-07 DIAGNOSIS — R26.89 BALANCE PROBLEM: ICD-10-CM

## 2023-07-07 DIAGNOSIS — G91.1 OBSTRUCTIVE HYDROCEPHALUS (HCC): ICD-10-CM

## 2023-07-07 DIAGNOSIS — Z91.81 AT HIGH RISK FOR FALLS: ICD-10-CM

## 2023-07-07 DIAGNOSIS — M54.12 CERVICAL RADICULOPATHY: ICD-10-CM

## 2023-07-07 DIAGNOSIS — Q05.2 SPINA BIFIDA OF LUMBOSACRAL REGION WITH HYDROCEPHALUS (HCC): ICD-10-CM

## 2023-07-07 DIAGNOSIS — M54.40 CHRONIC LOW BACK PAIN WITH SCIATICA, SCIATICA LATERALITY UNSPECIFIED, UNSPECIFIED BACK PAIN LATERALITY: Primary | ICD-10-CM

## 2023-07-07 DIAGNOSIS — R42 DIZZINESS: ICD-10-CM

## 2023-07-07 DIAGNOSIS — N31.9 NEUROGENIC BLADDER: ICD-10-CM

## 2023-07-07 DIAGNOSIS — G89.4 CHRONIC PAIN SYNDROME: ICD-10-CM

## 2023-07-07 DIAGNOSIS — R26.9 GAIT DIFFICULTY: ICD-10-CM

## 2023-07-07 DIAGNOSIS — G62.9 PERIPHERAL POLYNEUROPATHY: ICD-10-CM

## 2023-07-07 DIAGNOSIS — G44.86 CERVICOGENIC HEADACHE: ICD-10-CM

## 2023-07-07 DIAGNOSIS — R41.3 MEMORY PROBLEM: ICD-10-CM

## 2023-07-07 DIAGNOSIS — G60.9 IDIOPATHIC PERIPHERAL NEUROPATHY: ICD-10-CM

## 2023-07-07 DIAGNOSIS — M62.838 MUSCLE SPASM: ICD-10-CM

## 2023-07-07 PROCEDURE — 99214 OFFICE O/P EST MOD 30 MIN: CPT | Performed by: PSYCHIATRY & NEUROLOGY

## 2023-07-07 PROCEDURE — 4004F PT TOBACCO SCREEN RCVD TLK: CPT | Performed by: PSYCHIATRY & NEUROLOGY

## 2023-07-07 PROCEDURE — G8427 DOCREV CUR MEDS BY ELIG CLIN: HCPCS | Performed by: PSYCHIATRY & NEUROLOGY

## 2023-07-07 PROCEDURE — 99215 OFFICE O/P EST HI 40 MIN: CPT | Performed by: PSYCHIATRY & NEUROLOGY

## 2023-07-07 PROCEDURE — G8419 CALC BMI OUT NRM PARAM NOF/U: HCPCS | Performed by: PSYCHIATRY & NEUROLOGY

## 2023-07-07 RX ORDER — LORAZEPAM 1 MG/1
TABLET ORAL
Qty: 2 TABLET | Refills: 0 | Status: SHIPPED | OUTPATIENT
Start: 2023-07-07 | End: 2023-08-31

## 2023-07-07 RX ORDER — MECLIZINE HYDROCHLORIDE 25 MG/1
TABLET ORAL
Qty: 90 TABLET | Refills: 2 | Status: SHIPPED | OUTPATIENT
Start: 2023-07-07

## 2023-07-07 RX ORDER — PROMETHAZINE HYDROCHLORIDE 25 MG/1
TABLET ORAL
Qty: 60 TABLET | Refills: 2 | Status: SHIPPED | OUTPATIENT
Start: 2023-07-07

## 2023-07-07 RX ORDER — BUTALBITAL, ACETAMINOPHEN AND CAFFEINE 50; 325; 40 MG/1; MG/1; MG/1
TABLET ORAL
Qty: 60 TABLET | Refills: 0 | Status: SHIPPED | OUTPATIENT
Start: 2023-07-07

## 2023-07-07 ASSESSMENT — ENCOUNTER SYMPTOMS
APNEA: 0
BOWEL INCONTINENCE: 1
BLURRED VISION: 0
SCALP TENDERNESS: 0
CHEST TIGHTNESS: 0
FACIAL SWEATING: 0
EYE REDNESS: 0
SHORTNESS OF BREATH: 0
NAUSEA: 1
BLOOD IN STOOL: 0
ABDOMINAL DISTENTION: 0
SINUS PRESSURE: 0
EYE WATERING: 0
RHINORRHEA: 0
DIARRHEA: 0
SORE THROAT: 0
CHOKING: 0
COUGH: 0
EYE ITCHING: 0
EYE PAIN: 0
SWOLLEN GLANDS: 0
EYE DISCHARGE: 0
FACIAL SWELLING: 0
VOMITING: 0
CHANGE IN BOWEL HABIT: 0
CONSTIPATION: 0
WHEEZING: 0
VOICE CHANGE: 0
ABDOMINAL PAIN: 0
COLOR CHANGE: 0

## 2023-07-07 NOTE — PROGRESS NOTES
mental status, near-syncope, slurred speech or syncope. This is a chronic problem. Episode onset: MORE  THAN   3-4  YEARS. The neurological problem developed insidiously. The problem is unchanged. There was left-sided focality noted. Associated symptoms include bladder incontinence, bowel incontinence, dizziness, nausea and vertigo. Pertinent negatives include no abdominal pain, auditory change, aura, confusion, diaphoresis, fatigue, light-headedness, palpitations, shortness of breath or vomiting. Past treatments include medication and bed rest. The treatment provided mild relief. There is no history of a bleeding disorder, a clotting disorder, a CVA, dementia, head trauma, liver disease, mood changes or seizures. History obtained from  The patient         and other  available medical records were  Also  reviewed.                         PATIENT'S   NEUROLOGICAL    CONCERNS  INCLUDE  :                     1) H/O   CHRONIC  MIGRAINES   FOR   11  YEARS                                         -    FAIRLY     STABLE                       2)    ON  FIORICET ,  PHENERGAN     AND  MOTRIN     AS  NEEDED                                FOR  SYMPTOMATIC  RELIEF                                     3)    H/O   CHRONIC     TENSION  HEADACHES                                   -   STABLE                       4)   CHRONIC  NECK  PAIN,  NECK MUSCLE  SPASMS                            AND  CERVICAL  RADICULOPATHy       FOR     7   YEARS                           -   ON  FLEXERIL     AS  NEEDED  FROM  HER PCP                       5)      KNOWN    H/O    ARNOLD  CHIARI  MALFORMATION   TYPE  II                                 H/O   PREVIOUS    SHUNT      AT    THE  AGE OF    1   YEARS  OLD                                           -  STABLE                        6)        KNOWN    H/O    LUMBAR  SPINA  BIFIDA                                  AND   NEUROGENIC  BLADDER                                PREVIOUS  H/O  THREE

## 2023-08-08 ENCOUNTER — HOSPITAL ENCOUNTER (OUTPATIENT)
Dept: NEUROLOGY | Age: 38
Discharge: HOME | End: 2023-08-08
Attending: PSYCHIATRY & NEUROLOGY
Payer: MEDICARE

## 2023-08-08 ENCOUNTER — HOSPITAL ENCOUNTER (OUTPATIENT)
Dept: MRI IMAGING | Age: 38
Discharge: HOME | End: 2023-08-10
Attending: PSYCHIATRY & NEUROLOGY
Payer: MEDICARE

## 2023-08-08 DIAGNOSIS — Z91.81 AT HIGH RISK FOR FALLS: ICD-10-CM

## 2023-08-08 DIAGNOSIS — Q05.2 SPINA BIFIDA OF LUMBOSACRAL REGION WITH HYDROCEPHALUS (HCC): ICD-10-CM

## 2023-08-08 DIAGNOSIS — G63 POLYNEUROPATHY ASSOCIATED WITH UNDERLYING DISEASE (HCC): ICD-10-CM

## 2023-08-08 DIAGNOSIS — R26.9 GAIT DIFFICULTY: ICD-10-CM

## 2023-08-08 DIAGNOSIS — G60.9 IDIOPATHIC PERIPHERAL NEUROPATHY: ICD-10-CM

## 2023-08-08 PROCEDURE — 95886 MUSC TEST DONE W/N TEST COMP: CPT

## 2023-08-08 PROCEDURE — 72148 MRI LUMBAR SPINE W/O DYE: CPT

## 2023-08-08 PROCEDURE — 95912 NRV CNDJ TEST 11-12 STUDIES: CPT

## 2023-08-08 NOTE — PROCEDURES
recordable  bilaterally. Peroneal motor conduction studies recorded at tibialis anterior also  show significantly low amplitude and borderline distal latencies. Proximal conductions as measured by the F responses were nonrecordable  in right peroneal nerve, appears to be borderline in the left peroneal  nerve. Right and left tibial F responses were not recordable. Tibial H responses were not recordable bilaterally. Nerve  Conductions   Results  Were  Personally  Reviewed and  Analysed. Abnormal  Nerve  Conductions  Were  Personally  Repeated,  Verified, reconfirmed  And  Updated as needed  appropriately. Please    See   Wave  Forms   And    Details  Of     Nerve  Conduction   Studies   For  Additional  Information             Extensive   Needle  Electromyography  Was personally  Performed  In  Both        Lower  Extremities     In  The  Following  Muscles :        Gluteus  Medius,   Vastus  Lateralis,  Internal  Hamstring,    External  Hamstring,   Anterior Tibialis,  Medial  Gastrocnemius          Needle  Electromyography  Shows      A) Normal  insertional  Activity. There  Is  Absence  Of   P  Waves,  Fibrillations,  Fasciculations and        Other  Spontaneous   Activity. B) Voluntary  Motor unit  Potentials is  evaluations is  limited   due  to            Poor activation of motor units in both lower extremities. IMPRESSION:      1. There is electrodiagnostic evidence of severe, sensory motor          peripheral polyneuropathy involving both lower extremities. 2.  Nonrecordable F and H responses are suggestive of involvement of         L4-L5 and L5-S1 myotomes bilaterally. 3.  Needle electromyography shows no active denervation changes. Activation of the voluntary motor unit is limited. 4.  There is no definite electrodiagnostic evidence of inflammatory         myopathy involving both lower extremities.            Further clinical correlation and

## 2023-08-08 NOTE — PROGRESS NOTES
EMG/NCS Bilateral    lower Completed    PCP: Lynne Moe, APRN - CNP    Ordering: Lucho Chase. Darleen, Neurologist    Interpreting Physician: Lucho Chase.  Joanna Longoria, Neurologist    Technician: Simon Winter RN

## 2023-09-07 DIAGNOSIS — G43.009 MIGRAINE WITHOUT AURA AND WITHOUT STATUS MIGRAINOSUS, NOT INTRACTABLE: ICD-10-CM

## 2023-09-07 DIAGNOSIS — G44.86 CERVICOGENIC HEADACHE: ICD-10-CM

## 2023-09-07 RX ORDER — BUTALBITAL, ACETAMINOPHEN AND CAFFEINE 50; 325; 40 MG/1; MG/1; MG/1
TABLET ORAL
Qty: 60 TABLET | Refills: 0 | Status: SHIPPED | OUTPATIENT
Start: 2023-09-07

## 2023-09-11 ENCOUNTER — OFFICE VISIT (OUTPATIENT)
Dept: NEUROLOGY | Age: 38
End: 2023-09-11
Payer: MEDICARE

## 2023-09-11 VITALS
HEART RATE: 104 BPM | HEIGHT: 59 IN | DIASTOLIC BLOOD PRESSURE: 78 MMHG | WEIGHT: 134.8 LBS | SYSTOLIC BLOOD PRESSURE: 124 MMHG | OXYGEN SATURATION: 98 % | BODY MASS INDEX: 27.17 KG/M2

## 2023-09-11 DIAGNOSIS — G43.009 MIGRAINE WITHOUT AURA AND WITHOUT STATUS MIGRAINOSUS, NOT INTRACTABLE: ICD-10-CM

## 2023-09-11 DIAGNOSIS — M54.16 CHRONIC LUMBAR RADICULOPATHY: Primary | ICD-10-CM

## 2023-09-11 DIAGNOSIS — Z86.69 HISTORY OF TETHERED SPINAL CORD: ICD-10-CM

## 2023-09-11 DIAGNOSIS — Z98.2 VP (VENTRICULOPERITONEAL) SHUNT STATUS: ICD-10-CM

## 2023-09-11 DIAGNOSIS — G44.86 CERVICOGENIC HEADACHE: ICD-10-CM

## 2023-09-11 DIAGNOSIS — Q07.00 ARNOLD-CHIARI DEFORMITY (HCC): ICD-10-CM

## 2023-09-11 DIAGNOSIS — R41.3 MEMORY PROBLEM: ICD-10-CM

## 2023-09-11 DIAGNOSIS — F17.200 SMOKER: ICD-10-CM

## 2023-09-11 DIAGNOSIS — M54.12 CERVICAL RADICULOPATHY: ICD-10-CM

## 2023-09-11 DIAGNOSIS — R26.9 GAIT DIFFICULTY: ICD-10-CM

## 2023-09-11 DIAGNOSIS — G93.5 CHIARI MALFORMATION TYPE I (HCC): ICD-10-CM

## 2023-09-11 DIAGNOSIS — G89.4 CHRONIC PAIN SYNDROME: ICD-10-CM

## 2023-09-11 DIAGNOSIS — G62.9 PERIPHERAL POLYNEUROPATHY: ICD-10-CM

## 2023-09-11 DIAGNOSIS — Z91.81 AT HIGH RISK FOR FALLS: ICD-10-CM

## 2023-09-11 DIAGNOSIS — G40.909 SEIZURE CEREBRAL (HCC): ICD-10-CM

## 2023-09-11 DIAGNOSIS — N31.9 NEUROGENIC BLADDER: ICD-10-CM

## 2023-09-11 PROCEDURE — G8427 DOCREV CUR MEDS BY ELIG CLIN: HCPCS | Performed by: PSYCHIATRY & NEUROLOGY

## 2023-09-11 PROCEDURE — 99214 OFFICE O/P EST MOD 30 MIN: CPT | Performed by: PSYCHIATRY & NEUROLOGY

## 2023-09-11 PROCEDURE — 4004F PT TOBACCO SCREEN RCVD TLK: CPT | Performed by: PSYCHIATRY & NEUROLOGY

## 2023-09-11 PROCEDURE — G8419 CALC BMI OUT NRM PARAM NOF/U: HCPCS | Performed by: PSYCHIATRY & NEUROLOGY

## 2023-09-11 PROCEDURE — 99215 OFFICE O/P EST HI 40 MIN: CPT | Performed by: PSYCHIATRY & NEUROLOGY

## 2023-09-11 RX ORDER — BUDESONIDE AND FORMOTEROL FUMARATE DIHYDRATE 80; 4.5 UG/1; UG/1
AEROSOL RESPIRATORY (INHALATION)
COMMUNITY

## 2023-09-11 RX ORDER — HYDROXYZINE HYDROCHLORIDE 25 MG/1
TABLET, FILM COATED ORAL
COMMUNITY
Start: 2023-08-21

## 2023-09-11 ASSESSMENT — ENCOUNTER SYMPTOMS
COUGH: 0
FACIAL SWELLING: 0
ABDOMINAL DISTENTION: 0
SCALP TENDERNESS: 0
SHORTNESS OF BREATH: 0
CHOKING: 0
BLOOD IN STOOL: 0
CHEST TIGHTNESS: 0
CHANGE IN BOWEL HABIT: 0
WHEEZING: 0
NAUSEA: 1
EYE ITCHING: 0
FACIAL SWEATING: 0
EYE WATERING: 0
EYE PAIN: 0
SWOLLEN GLANDS: 0
COLOR CHANGE: 0
EYE DISCHARGE: 0
APNEA: 0
VOMITING: 0
BLURRED VISION: 0
VOICE CHANGE: 0
RHINORRHEA: 0
SORE THROAT: 0
EYE REDNESS: 0
SINUS PRESSURE: 0
DIARRHEA: 0
CONSTIPATION: 0

## 2023-09-11 NOTE — PROGRESS NOTES
portion sizes, drink less juice and soda pop, and eat more fruits and vegetables. Eat a healthy amount of food. A 3-ounce serving of meat, for example, is about the size of a deck of cards. Fill the rest of your plate with vegetables and whole grains. Limit the amount of soda and sports drinks you have every day. Drink more water when you are thirsty. Eat at least 5 servings of fruits and vegetables every day. It may seem like a lot, but it is not hard to reach this goal. A serving or helping is 1 piece of fruit, 1 cup of vegetables, or 2 cups of leafy, raw vegetables. Have an apple or some carrot sticks as an afternoon snack instead of a candy bar. Try to have fruits and/or vegetables at every meal.  Make exercise part of your daily routine. You may want to start with simple activities, such as walking, bicycling, or slow swimming. Try to be active 30 to 60 minutes every day. You do not need to do all 30 to 60 minutes all at once. For example, you can exercise 3 times a day for 10 or 20 minutes. Moderate exercise is safe for most people, but it is always a good idea to talk to your doctor before starting an exercise program.  Keep moving. Cheryolandan Dekkuns the lawn, work in the garden, or [employer]. Take the stairs instead of the elevator at work. If you smoke, quit. People who smoke have an increased risk for heart attack, stroke, cancer, and other lung illnesses. Quitting is hard, but there are ways to boost your chance of quitting tobacco for good. Use nicotine gum, patches, or lozenges. Ask your doctor about stop-smoking programs and medicines. Keep trying. In addition to reducing your risk of diseases in the future, you will notice some benefits soon after you stop using tobacco. If you have shortness of breath or asthma symptoms, they will likely get better within a few weeks after you quit. Limit how much alcohol you drink.  Moderate amounts of alcohol (up to 2 drinks a day for men, 1 drink a day for women)

## 2023-09-14 ENCOUNTER — HOSPITAL ENCOUNTER (OUTPATIENT)
Age: 38
Setting detail: OBSERVATION
Discharge: HOME | End: 2023-09-14
Attending: EMERGENCY MEDICINE | Admitting: INTERNAL MEDICINE
Payer: MEDICARE

## 2023-09-14 ENCOUNTER — APPOINTMENT (OUTPATIENT)
Dept: CT IMAGING | Facility: CLINIC | Age: 38
End: 2023-09-14
Payer: MEDICARE

## 2023-09-14 VITALS
WEIGHT: 134 LBS | RESPIRATION RATE: 16 BRPM | BODY MASS INDEX: 27.01 KG/M2 | TEMPERATURE: 98.2 F | HEART RATE: 76 BPM | DIASTOLIC BLOOD PRESSURE: 86 MMHG | SYSTOLIC BLOOD PRESSURE: 120 MMHG | OXYGEN SATURATION: 97 % | HEIGHT: 59 IN

## 2023-09-14 DIAGNOSIS — K62.5 HEMORRHAGE OF RECTUM AND ANUS: Primary | ICD-10-CM

## 2023-09-14 LAB
ALBUMIN SERPL-MCNC: 4.2 G/DL (ref 3.5–5.2)
ALBUMIN/GLOB SERPL: 1.5 {RATIO} (ref 1–2.5)
ALP SERPL-CCNC: 82 U/L (ref 35–104)
ALT SERPL-CCNC: 13 U/L (ref 5–33)
ANION GAP SERPL CALCULATED.3IONS-SCNC: 8 MMOL/L (ref 9–17)
AST SERPL-CCNC: 12 U/L
BASOPHILS # BLD: 0 K/UL (ref 0–0.2)
BASOPHILS NFR BLD: 1 % (ref 0–2)
BILIRUB SERPL-MCNC: <0.1 MG/DL (ref 0.3–1.2)
BUN SERPL-MCNC: 16 MG/DL (ref 6–20)
CALCIUM SERPL-MCNC: 9.1 MG/DL (ref 8.6–10.4)
CHLORIDE SERPL-SCNC: 106 MMOL/L (ref 98–107)
CO2 SERPL-SCNC: 24 MMOL/L (ref 20–31)
CREAT SERPL-MCNC: 0.7 MG/DL (ref 0.5–0.9)
EOSINOPHIL # BLD: 0.1 K/UL (ref 0–0.4)
EOSINOPHILS RELATIVE PERCENT: 1 % (ref 1–4)
ERYTHROCYTE [DISTWIDTH] IN BLOOD BY AUTOMATED COUNT: 16.5 % (ref 12.5–15.4)
GFR SERPL CREATININE-BSD FRML MDRD: >60 ML/MIN/1.73M2
GLUCOSE SERPL-MCNC: 117 MG/DL (ref 70–99)
HCT VFR BLD AUTO: 40.9 % (ref 36–46)
HCT VFR BLD AUTO: 42.9 % (ref 36.3–47.1)
HGB BLD-MCNC: 13.6 G/DL (ref 11.9–15.1)
HGB BLD-MCNC: 13.6 G/DL (ref 12–16)
INR PPP: 0.9
LYMPHOCYTES NFR BLD: 2 K/UL (ref 1–4.8)
LYMPHOCYTES RELATIVE PERCENT: 24 % (ref 24–44)
MCH RBC QN AUTO: 29.3 PG (ref 26–34)
MCHC RBC AUTO-ENTMCNC: 33.2 G/DL (ref 31–37)
MCV RBC AUTO: 88.1 FL (ref 80–100)
MONOCYTES NFR BLD: 0.6 K/UL (ref 0.1–1.2)
MONOCYTES NFR BLD: 7 % (ref 2–11)
NEUTROPHILS NFR BLD: 67 % (ref 36–66)
NEUTS SEG NFR BLD: 5.9 K/UL (ref 1.8–7.7)
PARTIAL THROMBOPLASTIN TIME: 22.8 SEC (ref 21.3–31.3)
PLATELET # BLD AUTO: 349 K/UL (ref 140–450)
PMV BLD AUTO: 8.5 FL (ref 6–12)
POTASSIUM SERPL-SCNC: 4.1 MMOL/L (ref 3.7–5.3)
PROT SERPL-MCNC: 7 G/DL (ref 6.4–8.3)
PROTHROMBIN TIME: 9.5 SEC (ref 9.4–12.6)
RBC # BLD AUTO: 4.64 M/UL (ref 4–5.2)
SODIUM SERPL-SCNC: 138 MMOL/L (ref 135–144)
WBC OTHER # BLD: 8.6 K/UL (ref 3.5–11)

## 2023-09-14 PROCEDURE — 85730 THROMBOPLASTIN TIME PARTIAL: CPT

## 2023-09-14 PROCEDURE — 85025 COMPLETE CBC W/AUTO DIFF WBC: CPT

## 2023-09-14 PROCEDURE — 85610 PROTHROMBIN TIME: CPT

## 2023-09-14 PROCEDURE — 6360000002 HC RX W HCPCS: Performed by: EMERGENCY MEDICINE

## 2023-09-14 PROCEDURE — 85018 HEMOGLOBIN: CPT

## 2023-09-14 PROCEDURE — 82728 ASSAY OF FERRITIN: CPT

## 2023-09-14 PROCEDURE — 82746 ASSAY OF FOLIC ACID SERUM: CPT

## 2023-09-14 PROCEDURE — 82607 VITAMIN B-12: CPT

## 2023-09-14 PROCEDURE — 83550 IRON BINDING TEST: CPT

## 2023-09-14 PROCEDURE — 96361 HYDRATE IV INFUSION ADD-ON: CPT

## 2023-09-14 PROCEDURE — 99285 EMERGENCY DEPT VISIT HI MDM: CPT

## 2023-09-14 PROCEDURE — 36415 COLL VENOUS BLD VENIPUNCTURE: CPT

## 2023-09-14 PROCEDURE — 2580000003 HC RX 258: Performed by: NURSE PRACTITIONER

## 2023-09-14 PROCEDURE — 96374 THER/PROPH/DIAG INJ IV PUSH: CPT

## 2023-09-14 PROCEDURE — 99222 1ST HOSP IP/OBS MODERATE 55: CPT | Performed by: NURSE PRACTITIONER

## 2023-09-14 PROCEDURE — A4216 STERILE WATER/SALINE, 10 ML: HCPCS | Performed by: NURSE PRACTITIONER

## 2023-09-14 PROCEDURE — 85014 HEMATOCRIT: CPT

## 2023-09-14 PROCEDURE — G0378 HOSPITAL OBSERVATION PER HR: HCPCS

## 2023-09-14 PROCEDURE — 74176 CT ABD & PELVIS W/O CONTRAST: CPT

## 2023-09-14 PROCEDURE — C9113 INJ PANTOPRAZOLE SODIUM, VIA: HCPCS | Performed by: NURSE PRACTITIONER

## 2023-09-14 PROCEDURE — 80053 COMPREHEN METABOLIC PANEL: CPT

## 2023-09-14 PROCEDURE — 85045 AUTOMATED RETICULOCYTE COUNT: CPT

## 2023-09-14 PROCEDURE — 6370000000 HC RX 637 (ALT 250 FOR IP): Performed by: NURSE PRACTITIONER

## 2023-09-14 PROCEDURE — 6360000002 HC RX W HCPCS: Performed by: NURSE PRACTITIONER

## 2023-09-14 PROCEDURE — 83540 ASSAY OF IRON: CPT

## 2023-09-14 PROCEDURE — 96375 TX/PRO/DX INJ NEW DRUG ADDON: CPT

## 2023-09-14 RX ORDER — OXCARBAZEPINE 150 MG/1
150 TABLET, FILM COATED ORAL DAILY
Status: DISCONTINUED | OUTPATIENT
Start: 2023-09-14 | End: 2023-09-14 | Stop reason: HOSPADM

## 2023-09-14 RX ORDER — SODIUM CHLORIDE 0.9 % (FLUSH) 0.9 %
5-40 SYRINGE (ML) INJECTION PRN
Status: DISCONTINUED | OUTPATIENT
Start: 2023-09-14 | End: 2023-09-14 | Stop reason: HOSPADM

## 2023-09-14 RX ORDER — SODIUM CHLORIDE 9 MG/ML
INJECTION, SOLUTION INTRAVENOUS CONTINUOUS
Status: DISCONTINUED | OUTPATIENT
Start: 2023-09-14 | End: 2023-09-14 | Stop reason: HOSPADM

## 2023-09-14 RX ORDER — SODIUM CHLORIDE 0.9 % (FLUSH) 0.9 %
5-40 SYRINGE (ML) INJECTION EVERY 12 HOURS SCHEDULED
Status: DISCONTINUED | OUTPATIENT
Start: 2023-09-14 | End: 2023-09-14 | Stop reason: HOSPADM

## 2023-09-14 RX ORDER — BUDESONIDE AND FORMOTEROL FUMARATE DIHYDRATE 80; 4.5 UG/1; UG/1
2 AEROSOL RESPIRATORY (INHALATION)
Status: DISCONTINUED | OUTPATIENT
Start: 2023-09-15 | End: 2023-09-14 | Stop reason: HOSPADM

## 2023-09-14 RX ORDER — BUDESONIDE AND FORMOTEROL FUMARATE DIHYDRATE 80; 4.5 UG/1; UG/1
2 AEROSOL RESPIRATORY (INHALATION)
Status: DISCONTINUED | OUTPATIENT
Start: 2023-09-14 | End: 2023-09-14

## 2023-09-14 RX ORDER — ONDANSETRON 4 MG/1
4 TABLET, ORALLY DISINTEGRATING ORAL EVERY 8 HOURS PRN
Status: DISCONTINUED | OUTPATIENT
Start: 2023-09-14 | End: 2023-09-14 | Stop reason: HOSPADM

## 2023-09-14 RX ORDER — ONDANSETRON 2 MG/ML
4 INJECTION INTRAMUSCULAR; INTRAVENOUS EVERY 6 HOURS PRN
Status: DISCONTINUED | OUTPATIENT
Start: 2023-09-14 | End: 2023-09-14 | Stop reason: HOSPADM

## 2023-09-14 RX ORDER — SODIUM CHLORIDE 9 MG/ML
INJECTION, SOLUTION INTRAVENOUS PRN
Status: DISCONTINUED | OUTPATIENT
Start: 2023-09-14 | End: 2023-09-14 | Stop reason: HOSPADM

## 2023-09-14 RX ORDER — ACETAMINOPHEN 650 MG/1
650 SUPPOSITORY RECTAL EVERY 6 HOURS PRN
Status: DISCONTINUED | OUTPATIENT
Start: 2023-09-14 | End: 2023-09-14 | Stop reason: HOSPADM

## 2023-09-14 RX ORDER — ACETAMINOPHEN 325 MG/1
650 TABLET ORAL EVERY 6 HOURS PRN
Status: DISCONTINUED | OUTPATIENT
Start: 2023-09-14 | End: 2023-09-14 | Stop reason: HOSPADM

## 2023-09-14 RX ORDER — MORPHINE SULFATE 2 MG/ML
2 INJECTION, SOLUTION INTRAMUSCULAR; INTRAVENOUS ONCE
Status: COMPLETED | OUTPATIENT
Start: 2023-09-14 | End: 2023-09-14

## 2023-09-14 RX ORDER — DIPHENHYDRAMINE HYDROCHLORIDE 50 MG/ML
25 INJECTION INTRAMUSCULAR; INTRAVENOUS ONCE
Status: COMPLETED | OUTPATIENT
Start: 2023-09-14 | End: 2023-09-14

## 2023-09-14 RX ORDER — ONDANSETRON 2 MG/ML
4 INJECTION INTRAMUSCULAR; INTRAVENOUS ONCE
Status: COMPLETED | OUTPATIENT
Start: 2023-09-14 | End: 2023-09-14

## 2023-09-14 RX ORDER — BUTALBITAL, ACETAMINOPHEN AND CAFFEINE 50; 325; 40 MG/1; MG/1; MG/1
1 TABLET ORAL 2 TIMES DAILY PRN
Status: DISCONTINUED | OUTPATIENT
Start: 2023-09-14 | End: 2023-09-14 | Stop reason: HOSPADM

## 2023-09-14 RX ORDER — NICOTINE 21 MG/24HR
1 PATCH, TRANSDERMAL 24 HOURS TRANSDERMAL DAILY
Status: DISCONTINUED | OUTPATIENT
Start: 2023-09-14 | End: 2023-09-14 | Stop reason: HOSPADM

## 2023-09-14 RX ADMIN — SODIUM CHLORIDE: 9 INJECTION, SOLUTION INTRAVENOUS at 15:10

## 2023-09-14 RX ADMIN — ONDANSETRON 4 MG: 2 INJECTION INTRAMUSCULAR; INTRAVENOUS at 12:03

## 2023-09-14 RX ADMIN — DIPHENHYDRAMINE HYDROCHLORIDE 25 MG: 50 INJECTION INTRAMUSCULAR; INTRAVENOUS at 12:03

## 2023-09-14 RX ADMIN — OXCARBAZEPINE 150 MG: 150 TABLET, FILM COATED ORAL at 16:26

## 2023-09-14 RX ADMIN — SODIUM CHLORIDE 40 MG: 9 INJECTION, SOLUTION INTRAMUSCULAR; INTRAVENOUS; SUBCUTANEOUS at 15:04

## 2023-09-14 RX ADMIN — MORPHINE SULFATE 2 MG: 2 INJECTION, SOLUTION INTRAMUSCULAR; INTRAVENOUS at 12:03

## 2023-09-14 RX ADMIN — BUTALBITAL, ACETAMINOPHEN, AND CAFFEINE 1 TABLET: 50; 325; 40 TABLET ORAL at 16:25

## 2023-09-14 ASSESSMENT — ENCOUNTER SYMPTOMS
COUGH: 0
ANAL BLEEDING: 1
SHORTNESS OF BREATH: 0
ABDOMINAL PAIN: 1
BACK PAIN: 0
VOMITING: 0
SORE THROAT: 0
WHEEZING: 0
DIARRHEA: 0
NAUSEA: 0

## 2023-09-14 ASSESSMENT — PAIN DESCRIPTION - ORIENTATION: ORIENTATION: ANTERIOR

## 2023-09-14 ASSESSMENT — PAIN DESCRIPTION - PAIN TYPE
TYPE: ACUTE PAIN
TYPE: ACUTE PAIN

## 2023-09-14 ASSESSMENT — PAIN SCALES - GENERAL
PAINLEVEL_OUTOF10: 7
PAINLEVEL_OUTOF10: 8
PAINLEVEL_OUTOF10: 8

## 2023-09-14 ASSESSMENT — PAIN DESCRIPTION - FREQUENCY
FREQUENCY: INTERMITTENT
FREQUENCY: INTERMITTENT

## 2023-09-14 ASSESSMENT — PAIN DESCRIPTION - LOCATION
LOCATION: HEAD
LOCATION: ABDOMEN

## 2023-09-14 ASSESSMENT — PAIN DESCRIPTION - DESCRIPTORS
DESCRIPTORS: ACHING
DESCRIPTORS: ACHING

## 2023-09-14 ASSESSMENT — PAIN - FUNCTIONAL ASSESSMENT
PAIN_FUNCTIONAL_ASSESSMENT: ACTIVITIES ARE NOT PREVENTED
PAIN_FUNCTIONAL_ASSESSMENT: 0-10

## 2023-09-14 ASSESSMENT — PAIN DESCRIPTION - ONSET: ONSET: GRADUAL

## 2023-09-14 NOTE — ED NOTES
VICTORINA Hendrickson called back for report. Report given. All questions answered at this time.       Kevin Lasren RN  09/14/23 1743

## 2023-09-14 NOTE — DISCHARGE SUMMARY
[facility]  Office: 7900  1826, DO, Vanessa Carter, DO, Nahomy Gifford, DO, Kathia Gonzales, DO, Rosalinda Noland MD, Kevon Peng MD, Renetta Yadav MD, Jessica Buckley MD,  Noé Hargrove MD, Kathie Garay MD, Radha Skinner, DO, Leif Mccoy MD,  Elysia Golden DO, Hugo Petty MD, Bhavin Guido MD, Gale Mcmanus, DO, Ajay Plunkett MD,  Manisha Westbrook DO, Rhonda Bridges MD, Ricardo Yoder MD, Radha Delgado MD, Lolis Grewal MD,  Zhang Cullen MD, Janine Collado MD, Mayra Soto MD, Alessandro Enriquez MD, Eliane Berg DO, Keri Porter MD,  Sasha Jordan MD, Bony Luciano MD, Marcelle Ray, CNP,  Adam Musa, CNP,, Diane Devries, CNP,  Akosua Ardon, [facility], Jose Johnson, CNP, Mamadou Simmons, CNP, Dayami Zelaya, CNP, Tre Baron, CNP, Catrina Dunham, CNP, Zeeshan Abdul, CNP, Steff Nava, CNS, Zoe Casillas, CNP, Maggie Ghosh, [facility]    Discharge Summary     Patient ID: Love Augustine  :  1985   MRN: [id number]     ACCOUNT:  [de-identified]   Patient's PCP: JOSE G Ward CNP  Admit Date: 2023   Discharge Date: 2023     Length of Stay: 0  Code Status:  Full Code  Admitting Physician: Zoë Sousa DO  Discharge Physician: JOSE G Mirza CNP     Active Discharge Diagnoses:     Hospital Problem Lists:  Principal Problem:    Rectal bleeding  Active Problems:    Spina bifida ([address])    Smoker    Seizure cerebral ([address])    Gastroesophageal reflux disease without esophagitis  Resolved Problems:    * No resolved hospital problems. *      Admission Condition:  good     Discharged Condition: good    Hospital Stay:     Hospital Course:    Love Augustine is a 34 y.o. Non- / non  female who presents with Rectal Bleeding (Pt reports for a couple of months, she has been intermittently experiencing abd pain followed by bright red rectal bleeding. inflammatory process. 2. No bowel obstruction. Consultations:    Consults:     Final Specialist Recommendations/Findings:   IP CONSULT TO GI      The patient was seen and examined on day of discharge and this discharge summary is in conjunction with any daily progress note from day of discharge. Discharge plan:     Disposition: Home    Physician Follow Up:     JOSE G Powell CNP  [facility] [address]  [phone]    Follow up  Hospital follow-up in 7 to 10 days    Manjula German, [address]  [phone]    Follow up  hospital follow up         Diet: regular diet and low fat, low cholesterol diet    Activity: As tolerated    Instructions to Patient:   Follow up with PCP in one week and follow up with gi  Call 911 or return to the ER if you experience a recurrence of your symptoms or start having fever, chills, chest pain or shortness of breath.          Discharge Medications:      Medication List        CONTINUE taking these medications      amitriptyline 25 MG tablet  Commonly known as: ELAVIL  Take 1 tablet by mouth nightly     busPIRone 5 MG tablet  Commonly known as: BUSPAR     butalbital-acetaminophen-caffeine -40 MG per tablet  Commonly known as: FIORICET, ESGIC  take 1-2 tablets by MOUTH TWICE DAILY AS NEEDED     cyclobenzaprine 10 MG tablet  Commonly known as: FLEXERIL  Take 1 tablet by mouth 3 times daily as needed (shoulder pain)     Daily Marcello Tabs     diphenoxylate-atropine 2.5-0.025 MG per tablet  Commonly known as: LOMOTIL     docusate sodium 100 MG capsule  Commonly known as: Colace  Take 1 capsule by mouth 2 times daily     FLUoxetine 20 MG capsule  Commonly known as: PROZAC     fluticasone 50 MCG/ACT nasal spray  Commonly known as: FLONASE  2 sprays by Each Nostril route daily     gabapentin 300 MG capsule  Commonly known as: NEURONTIN     hydrOXYzine HCl 25 MG tablet  Commonly known as: ATARAX     lisinopril 10 MG

## 2023-09-14 NOTE — ED NOTES
Writer attempted to call report to [address] [facility] [address] [facility]. [address] [facility] [address] [facility] staff reports receiving RN is on break and will call back for report.       Doretha Herrera RN  09/14/23 1606

## 2023-09-14 NOTE — PROGRESS NOTES
Pt admitted to room #2020 from [facility] ED. Oriented to room and call light/tv controls. Bed in lowest position, wheels locked, 2/4 side rails up  Call light in reach, room free of clutter, adequate lighting provided.

## 2023-09-14 NOTE — ED NOTES
[facility] [facility] Access contacted for consult for admission to:    [x] [address] [facility]   [] [facility]  [] [facility]  [] [facility]  [] [address] with Chintan Sanchez RN  09/14/23 8041

## 2023-09-14 NOTE — PLAN OF CARE
Problem: Pain  Goal: Verbalizes/displays adequate comfort level or baseline comfort level  Outcome: Progressing     Problem: Discharge Planning  Goal: Discharge to home or other facility with appropriate resources  Outcome: Progressing  Flowsheets (Taken 9/14/2023 1500)  Discharge to home or other facility with appropriate resources:   Identify barriers to discharge with patient and caregiver   Arrange for needed discharge resources and transportation as appropriate   Identify discharge learning needs (meds, wound care, etc)     Problem: Discharge Planning  Goal: Discharge to home or other facility with appropriate resources  Outcome: Progressing  Flowsheets (Taken 9/14/2023 1500)  Discharge to home or other facility with appropriate resources:   Identify barriers to discharge with patient and caregiver   Arrange for needed discharge resources and transportation as appropriate   Identify discharge learning needs (meds, wound care, etc)
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
## 2023-09-14 NOTE — ED NOTES
Report given to [facility] staff. All questions answered at this time.       Afia Sheets RN  09/14/23 6001

## 2023-09-15 ENCOUNTER — CARE COORDINATION (OUTPATIENT)
Dept: CASE MANAGEMENT | Age: 38
End: 2023-09-15

## 2023-09-15 ENCOUNTER — TELEPHONE (OUTPATIENT)
Dept: NEUROLOGY | Age: 38
End: 2023-09-15

## 2023-09-15 LAB
FERRITIN SERPL-MCNC: 14 NG/ML (ref 13–150)
FOLATE SERPL-MCNC: 16.5 NG/ML
IMM RETICS NFR: 8.8 % (ref 2.7–18.3)
IRON SATN MFR SERPL: 17 % (ref 20–55)
IRON SERPL-MCNC: 61 UG/DL (ref 37–145)
RETIC HEMOGLOBIN: 32.8 PG (ref 28.2–35.7)
RETICS # AUTO: 0.04 M/UL (ref 0.03–0.08)
RETICS/RBC NFR AUTO: 0.9 % (ref 0.5–1.9)
TIBC SERPL-MCNC: 369 UG/DL (ref 250–450)
UNSATURATED IRON BINDING CAPACITY: 308 UG/DL (ref 112–347)
VIT B12 SERPL-MCNC: 375 PG/ML (ref 232–1245)

## 2023-09-15 NOTE — CARE COORDINATION
[address] [facility] 250 Care Transitions Initial Follow Up Call    Call within 2 business days of discharge: Yes      Patient: Sabas Villegas Patient : 1985   MRN: [id number]  Reason for Admission: Hemorrhage of rectum and anus  Discharge Date: 23 RARS: No data recorded    Last Discharge [address] [facility],[address]       Date Complaint Diagnosis Description Type Department Provider    23 Rectal Bleeding; Nausea; Abdominal Pain Hemorrhage of rectum and anus ED to Hosp-Admission (Discharged) (ADMIT) [facility] [address] Ayad Hogan, DO; Elen Gaspar. .. Was this an external facility discharge? No Discharge Facility: [facility]    Challenges to be reviewed by the provider   Additional needs identified to be addressed with provider: No  none               Method of communication with provider: none. Attempted to contact Esthela at phone number listed for her, but a gentleman answered the phone. He said that [facility] was no available. Asked if writer could leave message for her to call, he hung up the phone. EC, mother was called and message left asking if she could have Esthela call at this number.   Will attempt at another date/time if no return call      Care Transitions 24 Hour Call    Do you have all of your prescriptions and are they filled?: Yes  Care Transitions Interventions                                     Follow Up  Future Appointments   Date Time Provider [address]   2023  6:94 PM [facility]       Gisel Shelby RN

## 2023-09-15 NOTE — TELEPHONE ENCOUNTER
Pt contacted writer - was recently in the hospital for bleeding from her rectum (see encounters). They couldn't find the source of the bleed, referred to GI. Hospital told her to d/c prilosec and ibuprofen d/t bleed, would like to know what she can take in their place. Please advise. Was seen in office on Monday.

## 2023-09-15 NOTE — TELEPHONE ENCOUNTER
CHART   REVIEWED. AVOID     ASA,    NSAID   MEDICATIONS    DUE   TO    GI  BLEED      AS  REPORTED. PLEASE   CONTACT    PCP   AND  GI    DOCTOR     FOR   ALTERNATE       MEDICATION   RECOMMENDATIONS.         [address] [facility]

## 2023-09-18 ENCOUNTER — CARE COORDINATION (OUTPATIENT)
Dept: CASE MANAGEMENT | Age: 38
End: 2023-09-18

## 2023-09-18 DIAGNOSIS — K62.5 RECTAL BLEEDING: Primary | ICD-10-CM

## 2023-09-18 PROCEDURE — 1111F DSCHRG MED/CURRENT MED MERGE: CPT | Performed by: NURSE PRACTITIONER

## 2023-09-18 NOTE — CARE COORDINATION
[facility] Care Transitions Initial Follow Up Call    Call within 2 business days of discharge: Yes    Patient Current Location:  Home: [address] [facility] [address] [facility]    Care Transition Nurse contacted the patient by telephone to perform post hospital discharge assessment. Verified name and  with patient as identifiers. Provided introduction to self, and explanation of the Care Transition Nurse role. Patient: Cy Fix Patient : 1985   MRN: [id number]  Reason for Admission: Hemorrhage of rectum  Discharge Date: 23 RARS: No data recorded    Last Discharge [address] [facility],[address]       Date Complaint Diagnosis Description Type Department Provider    23 Rectal Bleeding; Nausea; Abdominal Pain Hemorrhage of rectum and anus ED to Hosp-Admission (Discharged) (ADMIT) [facility] [address] Ayad Hogan, ; Erwin Ramirez. .. Was this an external facility discharge? No Discharge Facility: [facility]    Challenges to be reviewed by the provider   Additional needs identified to be addressed with provider: Yes  I referral               Method of communication with provider: none. Spoke to Yareli for transitions call. Patent was in observation at [facility] for rectal bleed. Labs wnl, referred to GI for follow up. Stated she called Dr Mendel Eland office for appt, was told she needs referral. Pt has PCP appt on  (Virgilio Chase) will request referral at appt. Pt has not had BM since discharge, is passing gas. Taking Colace. Advised to push fluids, add prune or apple juice, ambulation, may add laxative if needed. Advised to avoid NSAIDS, ASA. Discussed taking Tylenl if needed for pain or discomfort. Denies abdominal pain, nausea, vomiting. Has sl cramping in upper abdomen. Advised to return to ED for severe symptoms. Care Transition Nurse reviewed discharge instructions with patient who verbalized understanding.  The patient was given an opportunity to ask questions and does not have any further questions or

## 2023-09-22 ENCOUNTER — CARE COORDINATION (OUTPATIENT)
Dept: CASE MANAGEMENT | Age: 38
End: 2023-09-22

## 2023-09-22 NOTE — CARE COORDINATION
Care Transitions Outreach Attempt #1    Attempted to reach patient for transitions of care follow up. Unable to reach patient. HIPAA compliant message left on  requesting a return call. Patient: Love Augustine Patient : 1985 MRN: [id number]    Last Discharge [address] [facility],[address]       Date Complaint Diagnosis Description Type Department Provider    23 Rectal Bleeding; Nausea; Abdominal Pain Hemorrhage of rectum and anus ED to Hosp-Admission (Discharged) (ADMIT) [facility] 4500 Ayad Hogan DO; Shayy Conception. .. Was this an external facility discharge?  No Discharge Facility: [facility]    Noted following upcoming appointments from discharge chart review:   [facility] follow up appointment(s):   Future Appointments   Date Time Provider [address]   2023  5:24 PM [facility]        [facility]  Care Transition Nurse

## 2023-09-26 ENCOUNTER — CARE COORDINATION (OUTPATIENT)
Dept: CASE MANAGEMENT | Age: 38
End: 2023-09-26

## 2023-09-26 NOTE — CARE COORDINATION
[address] [facility] 250 Care Transitions Follow Up Call    Patient Current Location:  Home: [facility]    Care Transition Nurse contacted the patient by telephone to follow up after admission on 23. Verified name and  with patient as identifiers. Patient: Miriam Allan  Patient : 1985   MRN: [id number]  Reason for Admission: Hemorrhage of rectum  Discharge Date: 23 RARS: No data recorded    Needs to be reviewed by the provider   Additional needs identified to be addressed with provider: No  none             Method of communication with provider: none. Was able to contact Yareli for transitional outreach. She stated that she was doing \"all right\". She said that she continues with the upper abdominal pain and that she does not eat much because it can cause increased pain. She denied any further rectal bleeding, has had a bowel movement. She said that her stools are now diarrhea. She denies any vomiting. She did get the referral for GI and she has an appointment with them tomorrow. She had no further questions or concerns. Addressed changes since last contact:   if GI appointment was made  Discussed follow-up appointments. If no appointment was previously scheduled, appointment scheduling offered: Yes. Is follow up appointment scheduled within 7 days of discharge? Yes. Follow Up  Future Appointments   Date Time Provider [address]   2023  5:59 PM Ynes Camacho MD [facility] 2600 [facility] follow up appointment(s):  Dr Michael Eng Nurse reviewed medical action plan with patient and discussed any barriers to care and/or understanding of plan of care after discharge. Discussed appropriate site of care based on symptoms and resources available to patient including: PCP  Specialist  When to call 911. The patient agrees to contact the PCP office for questions related to their healthcare.      Advance Care Planning:   patient declined

## 2023-10-04 ENCOUNTER — CARE COORDINATION (OUTPATIENT)
Dept: CASE MANAGEMENT | Age: 38
End: 2023-10-04

## 2023-10-04 NOTE — CARE COORDINATION
Care Transitions Follow Up Call    Patient Current Location:  Home: [address] Transition Nurse contacted the patient by telephone to follow up after admission on 23. Verified name and  with patient as identifiers. Patient: Esthela Mcbride  Patient : 1985   MRN: [id number]  Reason for Admission: hemorrhage of rectum  Discharge Date: 23 RARS: No data recorded    Needs to be reviewed by the provider   Additional needs identified to be addressed with provider: No  none             Method of communication with provider: none. Spoke to [place] for transitions call. Pt Had [facility] f/u on , has colonoscopy scheduled for 10/27 for suspected IBS. Stated she has not had any further rectal bleeding since discharge. Appetite is OK, no abdominal pain, nausea, vomiting. Has prep for appt. Stated last colonoscopy was ate age 23. No further needs or concerns. CTN sign off for transitions. Addressed changes since last contact:   colonoscopy scheduled for 10/27  Discussed follow-up appointments. Follow Up  Future Appointments   Date Time Provider [facility] [address]     6:98 PM [facility]         Care Transition Nurse reviewed discharge instructions with patient and discussed any barriers to care and/or understanding of plan of care after discharge. Discussed appropriate site of care based on symptoms and resources available to patient including: PCP  Specialist  When to call Pratibha George. The patient agrees to contact the PCP office for questions related to their healthcare.      Patients top risk factors for readmission: Chiari malformation type II,  Arnold-Chiari deformity    Offered patient enrollment in the Remote Patient Monitoring (RPM) program for in-home monitoring: Patient is not eligible for RPM program.     Care Transitions Subsequent and Final Call    Subsequent and Final Calls  Do you have any ongoing

## 2023-10-09 DIAGNOSIS — G43.009 MIGRAINE WITHOUT AURA AND WITHOUT STATUS MIGRAINOSUS, NOT INTRACTABLE: ICD-10-CM

## 2023-10-09 DIAGNOSIS — G44.86 CERVICOGENIC HEADACHE: ICD-10-CM

## 2023-10-09 RX ORDER — BUTALBITAL, ACETAMINOPHEN AND CAFFEINE 50; 325; 40 MG/1; MG/1; MG/1
TABLET ORAL
Qty: 60 TABLET | Refills: 0 | OUTPATIENT
Start: 2023-10-09

## 2023-10-09 NOTE — TELEPHONE ENCOUNTER
Last Appt:  9/11/2023  Next Appt:   10/9/2023  Med verified in [facility] 9/7/23

## 2023-10-11 RX ORDER — BUTALBITAL, ACETAMINOPHEN AND CAFFEINE 50; 325; 40 MG/1; MG/1; MG/1
TABLET ORAL
Qty: 60 TABLET | Refills: 0 | OUTPATIENT
Start: 2023-10-11

## 2023-10-12 DIAGNOSIS — G43.009 MIGRAINE WITHOUT AURA AND WITHOUT STATUS MIGRAINOSUS, NOT INTRACTABLE: ICD-10-CM

## 2023-10-12 DIAGNOSIS — G44.86 CERVICOGENIC HEADACHE: ICD-10-CM

## 2023-10-12 RX ORDER — BUTALBITAL, ACETAMINOPHEN AND CAFFEINE 50; 325; 40 MG/1; MG/1; MG/1
TABLET ORAL
Qty: 60 TABLET | Refills: 2 | Status: SHIPPED | OUTPATIENT
Start: 2023-10-12

## 2023-10-16 DIAGNOSIS — G44.86 CERVICOGENIC HEADACHE: ICD-10-CM

## 2023-10-16 DIAGNOSIS — G43.009 MIGRAINE WITHOUT AURA AND WITHOUT STATUS MIGRAINOSUS, NOT INTRACTABLE: ICD-10-CM

## 2023-10-16 NOTE — TELEPHONE ENCOUNTER
Last Appt:  9/11/2023  Next Appt:   12/4/2023  Med verified in Epic     Patient needs refill on Fioricet. He sent one on 10/12 but his electronic signature didn't work.   Please resend

## 2023-10-17 RX ORDER — BUTALBITAL, ACETAMINOPHEN AND CAFFEINE 50; 325; 40 MG/1; MG/1; MG/1
TABLET ORAL
Qty: 60 TABLET | Refills: 2 | OUTPATIENT
Start: 2023-10-17

## 2023-10-19 ENCOUNTER — TELEPHONE (OUTPATIENT)
Dept: NEUROLOGY | Age: 38
End: 2023-10-19

## 2023-10-19 DIAGNOSIS — G44.86 CERVICOGENIC HEADACHE: ICD-10-CM

## 2023-10-19 DIAGNOSIS — G43.009 MIGRAINE WITHOUT AURA AND WITHOUT STATUS MIGRAINOSUS, NOT INTRACTABLE: ICD-10-CM

## 2023-10-19 RX ORDER — BUTALBITAL, ACETAMINOPHEN AND CAFFEINE 50; 325; 40 MG/1; MG/1; MG/1
TABLET ORAL
Qty: 60 TABLET | Refills: 2 | Status: SHIPPED | OUTPATIENT
Start: 2023-10-19

## 2023-10-19 NOTE — TELEPHONE ENCOUNTER
Rx is still not going through, it is saying Epcs digital signature requirement not up to date. Unable to fill rx until it is fixed.

## 2023-10-19 NOTE — TELEPHONE ENCOUNTER
PLEASE     REVIEW   AND  FOLLOW UP.     ALSO  NOTIFY    [address] [facility] [address] .     [address] [facility]

## 2023-11-03 ENCOUNTER — HOSPITAL ENCOUNTER (EMERGENCY)
Facility: CLINIC | Age: 38
Discharge: HOME | End: 2023-11-03
Attending: EMERGENCY MEDICINE
Payer: MEDICARE

## 2023-11-03 ENCOUNTER — APPOINTMENT (OUTPATIENT)
Dept: CT IMAGING | Facility: CLINIC | Age: 38
End: 2023-11-03
Attending: EMERGENCY MEDICINE
Payer: MEDICARE

## 2023-11-03 VITALS
TEMPERATURE: 97.8 F | DIASTOLIC BLOOD PRESSURE: 100 MMHG | OXYGEN SATURATION: 99 % | RESPIRATION RATE: 18 BRPM | HEART RATE: 106 BPM | SYSTOLIC BLOOD PRESSURE: 134 MMHG
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
## 2023-11-03 DIAGNOSIS — N30.00 ACUTE CYSTITIS WITHOUT HEMATURIA: ICD-10-CM

## 2023-11-03 LAB
ANION GAP SERPL CALCULATED.3IONS-SCNC: 12 MMOL/L (ref 9–17)
BACTERIA URNS QL MICRO: ABNORMAL
BASOPHILS # BLD: 0.1 K/UL (ref 0–0.2)
BASOPHILS NFR BLD: 1 % (ref 0–2)
BILIRUB UR QL STRIP: NEGATIVE
BUN SERPL-MCNC: 15 MG/DL (ref 6–20)
CALCIUM SERPL-MCNC: 9.5 MG/DL (ref 8.6–10.4)
CHARACTER UR: ABNORMAL
CHLORIDE SERPL-SCNC: 107 MMOL/L (ref 98–107)
CLARITY UR: ABNORMAL
CO2 SERPL-SCNC: 23 MMOL/L (ref 20–31)
COLOR UR: YELLOW
CREAT SERPL-MCNC: 0.6 MG/DL (ref 0.5–0.9)
EOSINOPHIL # BLD: 0.6 K/UL (ref 0–0.4)
EOSINOPHILS RELATIVE PERCENT: 8 % (ref 1–4)
EPI CELLS #/AREA URNS HPF: ABNORMAL /HPF (ref 0–5)
ERYTHROCYTE [DISTWIDTH] IN BLOOD BY AUTOMATED COUNT: 16.5 % (ref 12.5–15.4)
GFR SERPL CREATININE-BSD FRML MDRD: >60 ML/MIN/1.73M2
GLUCOSE SERPL-MCNC: 116 MG/DL (ref 70–99)
GLUCOSE UR STRIP-MCNC: NEGATIVE MG/DL
HCT VFR BLD AUTO: 41.4 % (ref 36–46)
HGB BLD-MCNC: 13.7 G/DL (ref 12–16)
HGB UR QL STRIP.AUTO: NEGATIVE
KETONES UR STRIP-MCNC: NEGATIVE MG/DL
LEUKOCYTE ESTERASE UR QL STRIP: NEGATIVE
LYMPHOCYTES NFR BLD: 2.9 K/UL (ref 1–4.8)
LYMPHOCYTES RELATIVE PERCENT: 35 % (ref 24–44)
MCH RBC QN AUTO: 29.6 PG (ref 26–34)
MCHC RBC AUTO-ENTMCNC: 33.2 G/DL (ref 31–37)
MCV RBC AUTO: 89 FL (ref 80–100)
MONOCYTES NFR BLD: 0.6 K/UL (ref 0.1–1.2)
MONOCYTES NFR BLD: 8 % (ref 2–11)
NEUTROPHILS NFR BLD: 48 % (ref 36–66)
NEUTS SEG NFR BLD: 4 K/UL (ref 1.8–7.7)
NITRITE UR QL STRIP: POSITIVE
PH UR STRIP: 6 [PH] (ref 5–8)
PLATELET # BLD AUTO: 357 K/UL (ref 140–450)
PMV BLD AUTO: 8.2 FL (ref 6–12)
POTASSIUM SERPL-SCNC: 4.1 MMOL/L (ref 3.7–5.3)
PROT UR STRIP-MCNC: NEGATIVE MG/DL
RBC # BLD AUTO: 4.64 M/UL (ref 4–5.2)
RBC #/AREA URNS HPF: ABNORMAL /HPF (ref 0–2)
SODIUM SERPL-SCNC: 142 MMOL/L (ref 135–144)
SP GR UR STRIP: 1.02 (ref 1–1.03)
UROBILINOGEN UR STRIP-ACNC: NORMAL EU/DL (ref 0–1)
WBC #/AREA URNS HPF: ABNORMAL /HPF (ref 0–5)
WBC OTHER # BLD: 8.2 K/UL (ref 3.5–11)

## 2023-11-03 PROCEDURE — 36415 COLL VENOUS BLD VENIPUNCTURE: CPT

## 2023-11-03 PROCEDURE — 81001 URINALYSIS AUTO W/SCOPE: CPT

## 2023-11-03 PROCEDURE — 71250 CT THORAX DX C-: CPT

## 2023-11-03 PROCEDURE — 80048 BASIC METABOLIC PNL TOTAL CA: CPT

## 2023-11-03 PROCEDURE — 85025 COMPLETE CBC W/AUTO DIFF WBC: CPT

## 2023-11-03 PROCEDURE — 99284 EMERGENCY DEPT VISIT MOD MDM: CPT

## 2023-11-03 PROCEDURE — 6370000000 HC RX 637 (ALT 250 FOR IP): Performed by: EMERGENCY MEDICINE

## 2023-11-03 PROCEDURE — 72131 CT LUMBAR SPINE W/O DYE: CPT

## 2023-11-03 PROCEDURE — 87186 SC STD MICRODIL/AGAR DIL: CPT

## 2023-11-03 PROCEDURE — 87088 URINE BACTERIA CULTURE: CPT

## 2023-11-03 PROCEDURE — 87086 URINE CULTURE/COLONY COUNT: CPT

## 2023-11-03 RX ORDER — CEPHALEXIN 500 MG/1
500 CAPSULE ORAL ONCE
Status: COMPLETED | OUTPATIENT
Start: 2023-11-03 | End: 2023-11-03

## 2023-11-03 RX ORDER — CEPHALEXIN 500 MG/1
500 CAPSULE ORAL 3 TIMES DAILY
Qty: 21 CAPSULE | Refills: 0 | Status: SHIPPED | OUTPATIENT
Start: 2023-11-03 | End: 2023-11-03 | Stop reason: SDUPTHER

## 2023-11-03 RX ORDER — CEPHALEXIN 500 MG/1
500 CAPSULE ORAL 3 TIMES DAILY
Qty: 21 CAPSULE | Refills: 0 | Status: SHIPPED | OUTPATIENT
Start: 2023-11-03 | End: 2023-11-10

## 2023-11-03 RX ORDER — HYDROCODONE BITARTRATE AND ACETAMINOPHEN 5; 325 MG/1; MG/1
1 TABLET ORAL ONCE
Status: COMPLETED | OUTPATIENT
Start: 2023-11-03 | End: 2023-11-03

## 2023-11-03 RX ADMIN — CEPHALEXIN 500 MG: 500 CAPSULE ORAL at 18:41

## 2023-11-03 RX ADMIN — HYDROCODONE BITARTRATE AND ACETAMINOPHEN 1 TABLET: 5; 325 TABLET ORAL at 17:11

## 2023-11-03 ASSESSMENT — ENCOUNTER SYMPTOMS
VOMITING: 0
DIARRHEA: 0
ABDOMINAL PAIN: 0
EYE REDNESS: 0
CONSTIPATION: 0
COUGH: 0
NAUSEA: 0
SHORTNESS OF BREATH: 0
CHEST TIGHTNESS: 0
BACK PAIN: 1

## 2023-11-03 ASSESSMENT — PAIN SCALES - GENERAL: PAINLEVEL_OUTOF10: 8

## 2023-11-03 ASSESSMENT — PAIN - FUNCTIONAL ASSESSMENT: PAIN_FUNCTIONAL_ASSESSMENT: 0-10

## 2023-11-03 ASSESSMENT — PAIN DESCRIPTION - ORIENTATION: ORIENTATION: LEFT

## 2023-11-03 ASSESSMENT — PAIN DESCRIPTION - LOCATION: LOCATION: RIB CAGE;BACK

## 2023-11-03 ASSESSMENT — PAIN DESCRIPTION - DESCRIPTORS: DESCRIPTORS: ACHING

## 2023-11-03 NOTE — DISCHARGE INSTRUCTIONS
If you had imaging today, your results are:  CT CHEST ABDOMEN PELVIS WO CONTRAST Additional Contrast? None   Preliminary Result   1. No evidence of acute injury in the chest, abdomen, or pelvis. No   visualized rib fracture. 2. No acute osseous abnormality of the lumbar spine. 3. Possible 17 mm nodule in the right lobe of the thyroid. Further   evaluation with thyroid ultrasound on a nonemergent basis is recommended. CT LUMBAR SPINE WO CONTRAST   Preliminary Result   1. No evidence of acute injury in the chest, abdomen, or pelvis. No   visualized rib fracture. 2. No acute osseous abnormality of the lumbar spine. 3. Possible 17 mm nodule in the right lobe of the thyroid. Further   evaluation with thyroid ultrasound on a nonemergent basis is recommended. Take your medication as indicated and prescribed. If you are given an antibiotic then, make sure you get the prescription filled and take the antibiotics until finished. PLEASE RETURN TO THE EMERGENCY DEPARTMENT IMMEDIATELY if your symptoms worsen in anyway or in 8-12 hours if not improved for re-evaluation. You should immediately return to the ER for symptoms such as increasing pain, bloody stool, fever, a feeling of passing out, light headed, dizziness, chest pain, shortness of breath, persistent nausea and/or vomiting, numbness or weakness to the arms or legs, coolness or color change of the arms or legs. Please understand that at this time there is no evidence for a more serious underlying process, but that early in the process of an illness or injury, an emergency department workup can be falsely reassuring. You should contact your family doctor within the next 24 hours for a follow up appointment. If you do not have one, we have attached the [facility] Same [facility]\" Physician line for you to call and they can provide you with one ([phone]). Virginie Mejia YOU!     From [address] and [facility]

## 2023-11-05 LAB
MICROORGANISM SPEC CULT: ABNORMAL
SPECIMEN DESCRIPTION: ABNORMAL

## 2023-11-17 ENCOUNTER — TELEPHONE (OUTPATIENT)
Dept: NEUROLOGY | Age: 38
End: 2023-11-17

## 2023-11-17 NOTE — TELEPHONE ENCOUNTER
S/P speaking with [address] for clarification and available of Rx Fioricet, contacted Pt. Pt verified the Fioricet is to remain at [facility] Pt reportedly received Rx 11/03/23 and currently has Refills x2 on file with current pharmacy (verified).

## 2024-02-04 DIAGNOSIS — G44.86 CERVICOGENIC HEADACHE: ICD-10-CM

## 2024-02-04 DIAGNOSIS — G43.009 MIGRAINE WITHOUT AURA AND WITHOUT STATUS MIGRAINOSUS, NOT INTRACTABLE: ICD-10-CM

## 2024-02-07 RX ORDER — BUTALBITAL, ACETAMINOPHEN AND CAFFEINE 50; 325; 40 MG/1; MG/1; MG/1
TABLET ORAL
Qty: 60 TABLET | Refills: 0 | Status: SHIPPED | OUTPATIENT
Start: 2024-02-07

## 2024-07-12 DIAGNOSIS — G44.86 CERVICOGENIC HEADACHE: ICD-10-CM

## 2024-07-12 DIAGNOSIS — G43.009 MIGRAINE WITHOUT AURA AND WITHOUT STATUS MIGRAINOSUS, NOT INTRACTABLE: ICD-10-CM

## 2024-07-12 RX ORDER — BUTALBITAL, ACETAMINOPHEN AND CAFFEINE 50; 325; 40 MG/1; MG/1; MG/1
TABLET ORAL
Qty: 60 TABLET | Refills: 0 | OUTPATIENT
Start: 2024-07-12

## 2024-12-30 ENCOUNTER — HOSPITAL ENCOUNTER (OUTPATIENT)
Age: 39
Discharge: HOME | End: 2025-01-01
Payer: MEDICARE

## 2024-12-30 ENCOUNTER — HOSPITAL ENCOUNTER (OUTPATIENT)
Dept: GENERAL RADIOLOGY | Age: 39
Discharge: HOME | End: 2025-01-01
Payer: MEDICARE

## 2024-12-30 DIAGNOSIS — R07.1 CHEST PAIN ON BREATHING: ICD-10-CM

## 2024-12-30 PROCEDURE — 71046 X-RAY EXAM CHEST 2 VIEWS: CPT
